# Patient Record
Sex: FEMALE | Race: WHITE | Employment: OTHER | URBAN - METROPOLITAN AREA
[De-identification: names, ages, dates, MRNs, and addresses within clinical notes are randomized per-mention and may not be internally consistent; named-entity substitution may affect disease eponyms.]

---

## 2017-01-01 ENCOUNTER — NURSING HOME VISIT (OUTPATIENT)
Dept: GERIATRICS | Facility: CLINIC | Age: 82
End: 2017-01-01
Payer: COMMERCIAL

## 2017-01-01 ENCOUNTER — TELEPHONE (OUTPATIENT)
Dept: GERIATRICS | Facility: CLINIC | Age: 82
End: 2017-01-01

## 2017-01-01 ENCOUNTER — TRANSFERRED RECORDS (OUTPATIENT)
Dept: HEALTH INFORMATION MANAGEMENT | Facility: CLINIC | Age: 82
End: 2017-01-01

## 2017-01-01 VITALS
WEIGHT: 92.8 LBS | RESPIRATION RATE: 16 BRPM | SYSTOLIC BLOOD PRESSURE: 122 MMHG | BODY MASS INDEX: 16.97 KG/M2 | OXYGEN SATURATION: 96 % | HEART RATE: 72 BPM | TEMPERATURE: 97.8 F | DIASTOLIC BLOOD PRESSURE: 69 MMHG

## 2017-01-01 VITALS
OXYGEN SATURATION: 93 % | DIASTOLIC BLOOD PRESSURE: 60 MMHG | SYSTOLIC BLOOD PRESSURE: 100 MMHG | HEIGHT: 62 IN | TEMPERATURE: 98.6 F | BODY MASS INDEX: 17 KG/M2 | RESPIRATION RATE: 17 BRPM | HEART RATE: 67 BPM | WEIGHT: 92.4 LBS

## 2017-01-01 VITALS
HEART RATE: 72 BPM | HEIGHT: 62 IN | WEIGHT: 92 LBS | DIASTOLIC BLOOD PRESSURE: 65 MMHG | TEMPERATURE: 98 F | OXYGEN SATURATION: 96 % | RESPIRATION RATE: 16 BRPM | BODY MASS INDEX: 16.93 KG/M2 | SYSTOLIC BLOOD PRESSURE: 97 MMHG

## 2017-01-01 VITALS
HEIGHT: 62 IN | SYSTOLIC BLOOD PRESSURE: 133 MMHG | WEIGHT: 91 LBS | DIASTOLIC BLOOD PRESSURE: 72 MMHG | OXYGEN SATURATION: 95 % | HEART RATE: 79 BPM | BODY MASS INDEX: 16.75 KG/M2 | RESPIRATION RATE: 15 BRPM | TEMPERATURE: 97.5 F

## 2017-01-01 DIAGNOSIS — F02.80 LATE ONSET ALZHEIMER'S DISEASE WITHOUT BEHAVIORAL DISTURBANCE (H): ICD-10-CM

## 2017-01-01 DIAGNOSIS — I35.0 NONRHEUMATIC AORTIC VALVE STENOSIS: ICD-10-CM

## 2017-01-01 DIAGNOSIS — M54.6 ACUTE LEFT-SIDED THORACIC BACK PAIN: Primary | ICD-10-CM

## 2017-01-01 DIAGNOSIS — I48.20 CHRONIC ATRIAL FIBRILLATION (H): ICD-10-CM
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
## 2017-01-01 DIAGNOSIS — M54.50 ACUTE LEFT-SIDED LOW BACK PAIN WITHOUT SCIATICA: ICD-10-CM

## 2017-01-01 DIAGNOSIS — G30.1 LATE ONSET ALZHEIMER'S DISEASE WITHOUT BEHAVIORAL DISTURBANCE (H): ICD-10-CM

## 2017-01-01 DIAGNOSIS — M81.0 AGE-RELATED OSTEOPOROSIS WITHOUT CURRENT PATHOLOGICAL FRACTURE: ICD-10-CM

## 2017-01-01 DIAGNOSIS — R53.83 LETHARGY: ICD-10-CM

## 2017-01-01 DIAGNOSIS — I50.32 CHRONIC DIASTOLIC CONGESTIVE HEART FAILURE (H): ICD-10-CM

## 2017-01-01 LAB
ANION GAP SERPL CALCULATED.3IONS-SCNC: 7 MMOL/L (ref 5–18)
BUN SERPL-MCNC: 25 MG/DL (ref 8–28)
CALCIUM SERPL-MCNC: 9.5 MG/DL (ref 8.5–10.5)
CHLORIDE SERPLBLD-SCNC: 109 MMOL/L (ref 98–107)
CO2 SERPL-SCNC: 27 MMOL/L (ref 22–31)
CREAT SERPL-MCNC: 0.69 MG/DL (ref 0.6–1.1)
ERYTHROCYTE [DISTWIDTH] IN BLOOD BY AUTOMATED COUNT: 15.7 % (ref 11–14.5)
GFR SERPL CREATININE-BSD FRML MDRD: >60 ML/MIN/1.73M2
GLUCOSE SERPL-MCNC: 105 MG/DL (ref 70–125)
HCT VFR BLD AUTO: 33.2 % (ref 35–47)
HEMOGLOBIN: 10.8 G/DL (ref 12–16)
MCH RBC QN AUTO: 30.4 PG (ref 27–34)
MCHC RBC AUTO-ENTMCNC: 32.5 G/DL (ref 32–36)
MCV RBC AUTO: 94 FL (ref 80–100)
PLATELET # BLD AUTO: 160 THOU/UL (ref 140–440)
POTASSIUM SERPL-SCNC: 3.3 MMOL/L (ref 3.5–5)
POTASSIUM SERPL-SCNC: 3.9 MMOL/L (ref 3.5–5)
RBC # BLD AUTO: 3.55 MILL/UL (ref 3.8–5.4)
SODIUM SERPL-SCNC: 143 MMOL/L (ref 136–145)
WBC # BLD AUTO: 8.4 THOU/UL (ref 4–11)

## 2017-01-01 PROCEDURE — 99309 SBSQ NF CARE MODERATE MDM 30: CPT | Performed by: INTERNAL MEDICINE

## 2017-01-01 PROCEDURE — 99308 SBSQ NF CARE LOW MDM 20: CPT | Performed by: NURSE PRACTITIONER

## 2017-01-01 PROCEDURE — 99309 SBSQ NF CARE MODERATE MDM 30: CPT | Performed by: NURSE PRACTITIONER

## 2017-01-01 RX ORDER — POTASSIUM CHLORIDE 1.5 G/1.58G
20 POWDER, FOR SOLUTION ORAL DAILY
COMMUNITY
Start: 2017-01-01 | End: 2017-01-01

## 2017-01-01 RX ORDER — ALBUTEROL SULFATE 0.83 MG/ML
1 SOLUTION RESPIRATORY (INHALATION) 3 TIMES DAILY
COMMUNITY
Start: 2017-01-01 | End: 2017-01-01

## 2017-01-24 ENCOUNTER — NURSING HOME VISIT (OUTPATIENT)
Dept: GERIATRICS | Facility: CLINIC | Age: 82
End: 2017-01-24
Payer: COMMERCIAL

## 2017-01-24 VITALS
SYSTOLIC BLOOD PRESSURE: 130 MMHG | TEMPERATURE: 98.4 F | HEIGHT: 62 IN | WEIGHT: 100.8 LBS | HEART RATE: 79 BPM | OXYGEN SATURATION: 95 % | RESPIRATION RATE: 18 BRPM | DIASTOLIC BLOOD PRESSURE: 85 MMHG | BODY MASS INDEX: 18.55 KG/M2

## 2017-01-24 DIAGNOSIS — M81.0 OSTEOPOROSIS: Primary | ICD-10-CM

## 2017-01-24 DIAGNOSIS — M25.531 RIGHT WRIST PAIN: ICD-10-CM

## 2017-01-24 PROCEDURE — 99207 ZZC CDG-CORRECTLY CODED, REVIEWED AND AGREE: CPT | Performed by: NURSE PRACTITIONER

## 2017-01-24 PROCEDURE — 99308 SBSQ NF CARE LOW MDM 20: CPT | Performed by: NURSE PRACTITIONER

## 2017-01-24 NOTE — PROGRESS NOTES
"[facility]    Chief Complaint   Patient presents with     Nursing Home Acute       HPI:    Marce Segura is a 89 year old  (3/12/1923), who is being seen today for an episodic care visit at [facility]. Today's concern is:    Osteoporosis  Right wrist pain  Seen today after pharmacist review of medications.  Suggest to d'c scheduled dilaudid.  Currently with 0.5mg QD and QD prn.  Medication started after patient sustained fall and right wrist fracture summer 2016.  Pain thought to be more r/t advanced osteoporosis.  Have attempted to not have scheduled dilaudid in the past and patient responded poorly, continually crying out in pain.  Pain most significant in the morning when scheduled dilaudid given.  Has been effective in controlling pain.  No adverse effects currently noted, no recent falls reported.  Have discussed with patient healthcare agent, Rae Ndiaye in the past.  Goal of care is comfort, and she was agreeable wanting to keep medication for patient's comfort.  Currently on small dose which has been effective     Last 3 BPs: 143/91, 143/79, 132/77.  Current Weight: 100.8lbs    REVIEW OF SYSTEMS:  10 point ROS of systems including Constitutional, Eyes, Respiratory, Cardiovascular, Gastroenterology, Genitourinary, Integumentary, Muscularskeletal, Psychiatric were all negative except for pertinent positives noted in my HPI.    /85 mmHg  Pulse 79  Temp(Src) 98.4  F (36.9  C)  Resp 18  Ht 5' 2\" (1.575 m)  Wt 100 lb 12.8 oz (45.723 kg)  BMI 18.43 kg/m2  SpO2 95%  GENERAL APPEARANCE:  Alert, in no distress  Respiratory: non-labored, on room air, no cough  Musculoskeletal: ambulates with 4ww, gait steady, no erythema/edema of joints  Neuro: no facial asymmetry, speech clear, no tremor     ASSESSMENT/PLAN:  (M81.0) Osteoporosis  (primary encounter diagnosis)  Comment: Chronic  Plan: Continue calcium with D supplementation  -Continue scheduled dilaudid--low " dose, has been effective in controlling pain, no adverse effects currently noted, no recent falls, improves patient's QOL--family agreeable to continue     (M25.531) Right wrist pain  Comment: Chronic--well managed with current POC  Plan: As above           JH Hogan CNP

## 2017-01-27 ENCOUNTER — TRANSFERRED RECORDS (OUTPATIENT)
Dept: HEALTH INFORMATION MANAGEMENT | Facility: CLINIC | Age: 82
End: 2017-01-27

## 2017-01-27 ENCOUNTER — TELEPHONE (OUTPATIENT)
Dept: GERIATRICS | Facility: CLINIC | Age: 82
End: 2017-01-27

## 2017-01-28 ENCOUNTER — TELEPHONE (OUTPATIENT)
Dept: GERIATRICS | Facility: CLINIC | Age: 82
End: 2017-01-28

## 2017-01-28 NOTE — TELEPHONE ENCOUNTER
UA positive, patient symptomatic with weakness and confusion. CrCl 39    PLAN  Cipro 250 mg PO BID x 3 days. DC atbx if UC negative or update provider with sensitivities.     Electronically signed by JH Rizzo GNP     At 1645 notified that UC now available and no growth  DC above order.  Monitor.    Electronically signed by JH Rizzo GNP

## 2017-02-02 ENCOUNTER — NURSING HOME VISIT (OUTPATIENT)
Dept: GERIATRICS | Facility: CLINIC | Age: 82
End: 2017-02-02
Payer: COMMERCIAL

## 2017-02-02 VITALS
HEART RATE: 81 BPM | WEIGHT: 98.1 LBS | OXYGEN SATURATION: 95 % | SYSTOLIC BLOOD PRESSURE: 127 MMHG | TEMPERATURE: 98 F | BODY MASS INDEX: 18.05 KG/M2 | HEIGHT: 62 IN | DIASTOLIC BLOOD PRESSURE: 78 MMHG | RESPIRATION RATE: 18 BRPM

## 2017-02-02 DIAGNOSIS — M81.0 OSTEOPOROSIS: ICD-10-CM

## 2017-02-02 DIAGNOSIS — I48.20 CHRONIC ATRIAL FIBRILLATION (H): ICD-10-CM

## 2017-02-02 DIAGNOSIS — F41.9 ANXIETY: ICD-10-CM

## 2017-02-02 DIAGNOSIS — M21.612 BUNION, LEFT: ICD-10-CM

## 2017-02-02 DIAGNOSIS — E03.9 HYPOTHYROIDISM, UNSPECIFIED TYPE: ICD-10-CM

## 2017-02-02 DIAGNOSIS — F02.80 LATE ONSET ALZHEIMER'S DISEASE WITHOUT BEHAVIORAL DISTURBANCE (H): Primary | ICD-10-CM

## 2017-02-02 DIAGNOSIS — I35.0 AORTIC VALVE STENOSIS, UNSPECIFIED ETIOLOGY: ICD-10-CM

## 2017-02-02 DIAGNOSIS — G30.1 LATE ONSET ALZHEIMER'S DISEASE WITHOUT BEHAVIORAL DISTURBANCE (H): Primary | ICD-10-CM

## 2017-02-02 PROCEDURE — 99310 SBSQ NF CARE HIGH MDM 45: CPT | Performed by: NURSE PRACTITIONER

## 2017-02-02 PROCEDURE — 99207 ZZC CDG-CORRECTLY CODED, REVIEWED AND AGREE: CPT | Performed by: NURSE PRACTITIONER

## 2017-02-02 NOTE — PROGRESS NOTES
[place] [facility]    Chief Complaint   Patient presents with     prison Regulatory       HPI:    Marce Segura is a 89 year old  (3/12/1923), who is being seen today for a federally mandated E/M visit at [facility]. Today's concerns are:    Chronic atrial fibrillation (H)  Aortic valve stenosis, unspecified etiology  Developed a-fib during hospitalization in August 2015. She was started on low dose amiodarone.  Cardiologist Dr. Holloway, recommendeds to continue ammiodarone given patient's underlying aortic stenosis, as she may become very symptomatic.  Last seen by cardiology in December 2015.  Due to age and goals of care patient is anticoagulated with baby aspirin. Her rate has been controlled HR 70's-80's, she denies shortness of breath, reports occasional palpitations--quickly resolve, chest pain, dizziness or lightheadedness.  Last 3 BPs: 132/69, 138/79, 129/76.  Labs closely monitored while on amiodarone     Late onset Alzheimer's disease without behavioral disturbance  Poor short-term memory, but no behaviors noted by staff.  Increased confusion and weakness reported end of last week, UA/UC ordered--negative.  Today appears at baseline.  Needs 24 hour monitoring for safety.  Enjoying participating in activities on site per nursing, eating and sleeping well.  Weight stable    Wt Readings from Last 3 Encounters:   02/02/17 98 lb 1.6 oz (44.498 kg)   01/24/17 100 lb 12.8 oz (45.723 kg)   12/28/16 98 lb 8 oz (44.679 kg)       Anxiety  Had increasing anxiety, disruptive to patient, family was agreeable to start Zoloft, began December 2015.  Staff and family feel has been helpful for patient, no adverse effects currently noted.  Goal of care comfort.  Today patient denies anxiety, sleeping well    Hypothyroidism, unspecified type  On synthroid, TSH     2.21   11/3/2016, currently at 100mcg daily.  Patient is on amiodarone, for history of a-fib with aortic  stenosis, likely cause of hypothyroidism.  Patient denies/no reports of constipation, changes in hair or nails, temperature intolerance.    Osteoporosis  No recent falls, ambulates with walker, denies fear of falling.  On calcium with vitamin D supplementation.  Had a fall in June, fractured right wrist, routine healing, was followed by RADHA Powers, orthopedic PA.  Continues to report pain to wrist since break.  Family agreeable to dilaudid Q morning for comfort.  Has worked well in managing pain in addition to her scheduled tylenol, patient today reports has been helpful.  Denies/no reports of injury or falls.  Continues to report pain to her right wrist.  Per previous conversation with ortho, significant osteoporosis to wrist and most likely cause of pain.  Denies changes in sensation or movement.      Bunion, left  Reporting pain to her left foot today.  Patient with bunion that rubs on her shoe--currently with dressing in place to protect area--still with erythema.  Resting in bed with her shoes on.        Current Weight: 98.1lbs    ALLERGIES: No known allergies  PAST MEDICAL HISTORY:  has a past medical history of Back pain; Lumbar compression fracture (H); Atrial fibrillation (H); Congestive heart failure (H); Hyperkalemia; Hypernatremia; Subarachnoid hemorrhage (H) (8/10/15); Dementia without behavioral disturbance; Osteoporosis; and Aortic stenosis.  PAST SURGICAL HISTORY:  has past surgical history that includes neck injury (years ago); Sigmoidoscopy flexible (12/19/2011); and lower back surgery.  FAMILY HISTORY: family history includes CANCER in her sister and sister; DIABETES in her father and mother; Hypertension in her brother.  SOCIAL HISTORY:  reports that she has never smoked. She has never used smokeless tobacco. She reports that she does not drink alcohol or use illicit drugs.    MEDICATIONS:  Current Outpatient Prescriptions   Medication Sig Dispense Refill     HYDROmorphone (DILAUDID) 2 MG tablet Take  0.05 mg by mouth daily       HYDROmorphone (DILAUDID) 2 MG tablet Take 0.5 mg by mouth daily as needed for moderate to severe pain       diclofenac (VOLTAREN) 1 % GEL Place 2 g onto the skin 4 times daily        diclofenac (VOLTAREN) 1 % GEL Place 2 g onto the skin 2 times daily as needed for moderate pain       levothyroxine (SYNTHROID,LEVOTHROID) 100 MCG tablet Take 100 mcg by mouth daily       conjugated estrogens (PREMARIN) vaginal cream Place 0.5 g vaginally twice a week On Monday and Thursday       witch hazel-glycerin (TUCKS) pad Apply 1 each topically as needed for hemorrhoids       acetaminophen (TYLENOL) 500 MG tablet Take 1,000 mg by mouth as needed for mild pain       acetaminophen (TYLENOL) 500 MG tablet Take 1,000 mg by mouth 3 times daily       pramox-pe-glycerin-petrolatum (PREPARATION H) 1-0.25-14.4-15 % CREA Place rectally 4 times daily as needed for hemorrhoids       sertraline (ZOLOFT) 25 MG tablet Take 25 mg by mouth daily       amiodarone (PACERONE/CODARONE) 200 MG tablet Take 0.5 tablets (100 mg) by mouth daily 60 tablet 4     aspirin 81 MG chewable tablet Take 81 mg by mouth daily       ipratropium - albuterol 0.5 mg/2.5 mg/3 mL (DUONEB) 0.5-2.5 (3) MG/3ML nebulization Take 1 vial by nebulization every 6 hours as needed for shortness of breath / dyspnea or wheezing AND give TID until 8/1/2016 then go back to q 6 hrs PRN       senna-docusate (SENOKOT-S;PERICOLACE) 8.6-50 MG per tablet Take 1 tablet by mouth 2 times daily        bisacodyl (DULCOLAX) 10 MG suppository Place 10 mg rectally daily as needed for constipation       Calcium Carbonate-Vitamin D (CALCIUM + D) 600-200 MG-UNIT per tablet Take 1 tablet by mouth 2 times daily.       Multiple Vitamin (MULTIVITAMIN) per tablet Take 1 tablet by mouth daily.       Medications reviewed:  Medications reconciled to facility chart and changes were made to reflect current medications as identified as above med list. Below are the changes that were  "made:   Medications stopped since last EPIC medication reconciliation:   There are no discontinued medications.    Medications started since last [facility] medication reconciliation:  No orders of the defined types were placed in this encounter.         Case Management:  I have reviewed the care plan and MDS and do agree with the plan. Patient's desire to return to the community is not present.  Information reviewed:  Medications, vital signs, orders, and nursing notes.    ROS:  10 point ROS of systems including Constitutional, Eyes, Respiratory, Cardiovascular, Gastroenterology, Genitourinary, Integumentary, Muscularskeletal, Psychiatric were all negative except for pertinent positives noted in my HPI.    Exam:  /78 mmHg  Pulse 81  Temp(Src) 98  F (36.7  C)  Resp 18  Ht 5' 2\" (1.575 m)  Wt 98 lb 1.6 oz (44.498 kg)  BMI 17.94 kg/m2  SpO2 95%  GENERAL APPEARANCE:  Alert, in no distress  EYES:  EOM, conjunctivae, lids, pupils and irises normal  NECK:  No adenopathy,masses or thyromegaly  RESP:  respiratory effort and palpation of chest normal, lungs clear to auscultation , no respiratory distress  CV:  Palpation and auscultation of heart done , no edema, +2 pedal pulses, rate-normal, grade 2/6 murmur  ABDOMEN:  normal bowel sounds, soft, nontender, no hepatosplenomegaly or other masses  M/S:   Gait and station abnormal uses walker for ambulation, kyphosis, CMS intact, right wrist ROM intact, bilateral hand strength +3/5   Digits and nails normal  SKIN:  left foot with non-blanchable erythema to bunion, no open areas or rashes noted   NEURO:   Cranial nerves 2-12 are normal tested and grossly at patient's baseline, speech clear, no facial asymmetry   PSYCH:  oriented to person, place, insight and judgement impaired, memory impaired , affect and mood normal, no anxiety observed during encounter, pleasant        Lab/Diagnostic data:    Last Basic Metabolic Panel:  Lab Test 11/03/16   NA  139   POTASSIUM  3.8 "   CHLORIDE  105   MARIANO  8.6   CO2   --    BUN  18   CR  0.64   GLC  82       Liver Function Studies -   Lab Test 11/03/16   PROTTOTAL  6.2   ALBUMIN  2.8*   BILITOTAL  0.2   ALKPHOS  53   AST  14   ALT  8     TSH   Date Value Ref Range Status   11/03/2016 2.21 mcU/mL Final     ASSESSMENT/PLAN  (G30.1,  F02.80) Late onset Alzheimer's disease without behavioral disturbance  (primary encounter diagnosis)  Comment: Chronic   Plan: appropriate for LTC    (I48.2) Chronic atrial fibrillation (H)  Comment: Chronic, rate controlled  Plan: Continue amiodarone as long as patient tolerates  -LFT's, TSH, BMP, Mg Q3 months for monitoring  -baby asa anticoagulation given age and goals of care     (F41.9) Anxiety  Comment: Chronic, significantly improved with Zoloft  Plan: Continue Zoloft, no adverse effects noted, goal of care comfort, has improved patient's QOL     (E03.9) Hypothyroidism, unspecified type  Comment: Chronic, stable  Plan: Continue synthroid  -Monitor TSH    (I35.0) Aortic valve stenosis, unspecified etiology  Comment: Chronic   Plan: As per POC under a-fib    (M81.0) Osteoporosis  Comment: Chronic, contributes to pain   Plan: Continue calcium with vitamin D  -Continue APAP and dilaudid for pain--family agreeable, has helped with controlling pain, goal of care comfort    (M21.612) Bunion, left  Comment: Chronic, rubbing on shoes  Plan: shoes to be off when in bed  -See podiatry, may need to be fit for new shoes  -Continue dressing to site for added protection--lambs wool   -Staff to monitor for skin breakdown         Total time spent with patient visit was 35 min including patient visit and review of past records.Greater than 50% of total time spent with counseling and coordinating care.    Electronically signed by:  JH Hogan CNP

## 2017-04-17 ENCOUNTER — NURSING HOME VISIT (OUTPATIENT)
Dept: GERIATRICS | Facility: CLINIC | Age: 82
End: 2017-04-17
Payer: COMMERCIAL

## 2017-04-17 DIAGNOSIS — G30.1 LATE ONSET ALZHEIMER'S DISEASE WITHOUT BEHAVIORAL DISTURBANCE (H): ICD-10-CM

## 2017-04-17 DIAGNOSIS — E46 PROTEIN-CALORIE MALNUTRITION (H): ICD-10-CM

## 2017-04-17 DIAGNOSIS — F02.80 LATE ONSET ALZHEIMER'S DISEASE WITHOUT BEHAVIORAL DISTURBANCE (H): ICD-10-CM

## 2017-04-17 DIAGNOSIS — I35.0 AORTIC VALVE STENOSIS, UNSPECIFIED ETIOLOGY: ICD-10-CM

## 2017-04-17 DIAGNOSIS — Z71.89 ADVANCED DIRECTIVES, COUNSELING/DISCUSSION: ICD-10-CM

## 2017-04-17 DIAGNOSIS — I50.32 CHRONIC DIASTOLIC CONGESTIVE HEART FAILURE (H): Primary | ICD-10-CM

## 2017-04-17 DIAGNOSIS — I48.0 PAROXYSMAL ATRIAL FIBRILLATION (H): ICD-10-CM

## 2017-04-17 PROCEDURE — 99207 ZZC CDG-CORRECTLY CODED, REVIEWED AND AGREE: CPT | Performed by: FAMILY MEDICINE

## 2017-04-17 PROCEDURE — 99310 SBSQ NF CARE HIGH MDM 45: CPT | Performed by: FAMILY MEDICINE

## 2017-04-17 NOTE — PROGRESS NOTES
"  [facility]    Chief Complaint   Patient presents with     [employer] Regulatory       HPI:    Marce Segura is a 89 year old  (3/12/1923), who is being seen today for a federally mandated E/M visit at [facility]. Today's concerns are:  1. Chronic diastolic congestive heart failure (H) - patient SOB at rest, discussed with nurse, new change, not noted earlier. Lungs clear, does have AS,    2. Aortic valve stenosis, unspecified etiology -    3. Paroxysmal atrial fibrillation (H) reg rate but elevated today, on amiodarone   4. Late onset Alzheimer's disease without behavioral disturbance - unable to give much hx. Would like \"help\" breathing   5. Protein-calorie malnutrition (H) - 8 lb wt loss over last 6 months. BMI now 17. Due to dementia, dysphagia   6. Advance Care Planning - confirmed DNR, comfort care       ALLERGIES: No known allergies  PAST MEDICAL HISTORY:  has a past medical history of Aortic stenosis; Atrial fibrillation (H); Back pain; Congestive heart failure (H); Dementia without behavioral disturbance; Hyperkalemia; Hypernatremia; Lumbar compression fracture (H); Osteoporosis; and Subarachnoid hemorrhage (H) (8/10/15).  PAST SURGICAL HISTORY:  has a past surgical history that includes neck injury (years ago); Sigmoidoscopy flexible (12/19/2011); and lower back surgery.  FAMILY HISTORY: family history includes CANCER in her sister and sister; DIABETES in her father and mother; Hypertension in her brother.  SOCIAL HISTORY:  reports that she has never smoked. She has never used smokeless tobacco. She reports that she does not drink alcohol or use illicit drugs.    MEDICATIONS:  Current Outpatient Prescriptions   Medication Sig Dispense Refill     magnesium hydroxide (MILK OF MAGNESIA) 400 MG/5ML suspension Take 30 mLs by mouth daily as needed for constipation or heartburn       HYDROmorphone (DILAUDID) 2 MG tablet Take 0.05 mg by mouth daily   "     HYDROmorphone (DILAUDID) 2 MG tablet Take 0.5 mg by mouth daily as needed for moderate to severe pain       diclofenac (VOLTAREN) 1 % GEL Place 2 g onto the skin 4 times daily        diclofenac (VOLTAREN) 1 % GEL Place 2 g onto the skin 2 times daily as needed for moderate pain       levothyroxine (SYNTHROID,LEVOTHROID) 100 MCG tablet Take 100 mcg by mouth daily       conjugated estrogens (PREMARIN) vaginal cream Place 0.5 g vaginally twice a week On Monday and Thursday       witch hazel-glycerin (TUCKS) pad Apply 1 each topically as needed for hemorrhoids       acetaminophen (TYLENOL) 500 MG tablet Take 1,000 mg by mouth as needed for mild pain       acetaminophen (TYLENOL) 500 MG tablet Take 1,000 mg by mouth 3 times daily       pramox-pe-glycerin-petrolatum (PREPARATION H) 1-0.25-14.4-15 % CREA Place rectally 4 times daily as needed for hemorrhoids       sertraline (ZOLOFT) 25 MG tablet Take 25 mg by mouth daily       amiodarone (PACERONE/CODARONE) 200 MG tablet Take 0.5 tablets (100 mg) by mouth daily 60 tablet 4     aspirin 81 MG chewable tablet Take 81 mg by mouth daily       ipratropium - albuterol 0.5 mg/2.5 mg/3 mL (DUONEB) 0.5-2.5 (3) MG/3ML nebulization Take 1 vial by nebulization every 6 hours as needed for shortness of breath / dyspnea or wheezing AND give TID until 8/1/2016 then go back to q 6 hrs PRN       senna-docusate (SENOKOT-S;PERICOLACE) 8.6-50 MG per tablet Take 1 tablet by mouth 2 times daily        bisacodyl (DULCOLAX) 10 MG suppository Place 10 mg rectally daily as needed for constipation       Calcium Carbonate-Vitamin D (CALCIUM + D) 600-200 MG-UNIT per tablet Take 1 tablet by mouth 2 times daily.       Multiple Vitamin (MULTIVITAMIN) per tablet Take 1 tablet by mouth daily.       Medications reviewed:  Medications reconciled to facility chart and changes were made to reflect current medications as identified as above med list. Below are the changes that were made:   Medications  "stopped since last EPIC medication reconciliation:   There are no discontinued medications.    Medications started since last [facility] medication reconciliation:  Orders Placed This Encounter   Medications     magnesium hydroxide (MILK OF MAGNESIA) 400 MG/5ML suspension     Sig: Take 30 mLs by mouth daily as needed for constipation or heartburn         Case Management:  I have reviewed the care plan and MDS and do agree with the plan. Patient's desire to return to the community is not present.  Information reviewed:  Medications, vital signs, orders, and nursing notes.    ROS:  Unobtainable secondary to cognitive impairment or aphasia, but today pt reports breathing heavy    Exam:  /72  Pulse 74  Temp 98.4  F (36.9  C)  Resp 18  Ht 5' 2\" (1.575 m)  Wt 97 lb 12.8 oz (44.4 kg)  SpO2 95%  BMI 17.89 kg/m2  GENERAL APPEARANCE:  Alert, in mild distress due to SOB  RESP:  lungs clear to auscultation , diminished breath sounds bilat bases post, dyspneic, SOB RR 22  CV:  Palpation and auscultation of heart done , no edema, tachycardic rate 94  M/S:   Gait and station abnormal bed bound today  PSYCH:  memory impaired , anxious    Lab/Diagnostic data:      No recent labs on file.    ASSESSMENT/PLAN    1. Chronic diastolic congestive heart failure (H) - patient SOB at rest, discussed with nurse, new change, not noted earlier. Lungs clear, does have AS, Plan- add O2 2liter to see if helps breathing., monitor for signs of pul edema and add lasix if needed, If worsens will contact family to recommend hospice   2. Aortic valve stenosis, unspecified etiology - stable   3. Paroxysmal atrial fibrillation (H) reg rate but elevated today, on amiodarone   4. Late onset Alzheimer's disease without behavioral disturbance - unable to give much hx. Would like \"help\" breathing- cont 24/7 NH care   5. Protein-calorie malnutrition (H) - 8 lb wt loss over last 6 months. BMI now 17. Due to dementia, dysphagia   6. Advance Care Planning - " confirmed DNR, comfort care, cont tx plan with added O2           Electronically signed by:  Russ Martinez MD

## 2017-04-20 VITALS
HEART RATE: 94 BPM | RESPIRATION RATE: 22 BRPM | TEMPERATURE: 98.4 F | BODY MASS INDEX: 18 KG/M2 | WEIGHT: 97.8 LBS | DIASTOLIC BLOOD PRESSURE: 72 MMHG | OXYGEN SATURATION: 92 % | SYSTOLIC BLOOD PRESSURE: 145 MMHG | HEIGHT: 62 IN

## 2017-04-20 PROBLEM — I50.32 CHRONIC DIASTOLIC CONGESTIVE HEART FAILURE (H): Status: ACTIVE | Noted: 2017-04-20

## 2017-04-20 PROBLEM — E46 PROTEIN-CALORIE MALNUTRITION (H): Status: ACTIVE | Noted: 2017-04-20

## 2017-04-20 PROBLEM — I35.0 AORTIC VALVE STENOSIS, UNSPECIFIED ETIOLOGY: Status: ACTIVE | Noted: 2017-04-20

## 2017-04-21 ENCOUNTER — NURSING HOME VISIT (OUTPATIENT)
Dept: GERIATRICS | Facility: CLINIC | Age: 82
End: 2017-04-21
Payer: COMMERCIAL

## 2017-04-21 VITALS
HEART RATE: 96 BPM | OXYGEN SATURATION: 91 % | RESPIRATION RATE: 20 BRPM | BODY MASS INDEX: 18.26 KG/M2 | SYSTOLIC BLOOD PRESSURE: 133 MMHG | HEIGHT: 62 IN | WEIGHT: 99.2 LBS | DIASTOLIC BLOOD PRESSURE: 89 MMHG | TEMPERATURE: 97.2 F

## 2017-04-21 DIAGNOSIS — R30.0 DYSURIA: ICD-10-CM

## 2017-04-21 DIAGNOSIS — I50.32 CHRONIC DIASTOLIC CONGESTIVE HEART FAILURE (H): Primary | ICD-10-CM

## 2017-04-21 DIAGNOSIS — F41.9 ANXIETY: ICD-10-CM

## 2017-04-21 PROCEDURE — 99308 SBSQ NF CARE LOW MDM 20: CPT | Performed by: NURSE PRACTITIONER

## 2017-04-21 NOTE — PROGRESS NOTES
[place] [facility]    Chief Complaint   Patient presents with     Heart Failure     Dysuria       HPI:    Marce Segura is a 89 year old  (3/12/1923), who is being seen today for an episodic care visit at [facility]. Today's concern is: CHF, anxiety, dysuria.  Seen 4/17/17 noted to have some sob with conversation. O2 ordered for comfort. dtr called and stated she did not want O2 for resident as her baseline O2 sats are > 90. Feels O2 tubing would be tripping hazard.  Had staff track O2 past 2 days off O2. sats 91-93% RA.  Has occ anxiety which can result in sob. Prn dilaudid started for resp. Distress. Today staff reports some increased confusion. Resident reports dysuria, increased frequency.      ALLERGIES: No known allergies  Past Medical, Surgical, Family and Social History reviewed and updated in [facility].    Current Outpatient Prescriptions   Medication Sig Dispense Refill     magnesium hydroxide (MILK OF MAGNESIA) 400 MG/5ML suspension Take 30 mLs by mouth daily as needed for constipation or heartburn       HYDROmorphone (DILAUDID) 2 MG tablet Take 0.05 mg by mouth daily       HYDROmorphone (DILAUDID) 2 MG tablet Take 0.5 mg by mouth daily as needed for moderate to severe pain       diclofenac (VOLTAREN) 1 % GEL Place 2 g onto the skin 4 times daily        diclofenac (VOLTAREN) 1 % GEL Place 2 g onto the skin 2 times daily as needed for moderate pain       levothyroxine (SYNTHROID,LEVOTHROID) 100 MCG tablet Take 100 mcg by mouth daily       conjugated estrogens (PREMARIN) vaginal cream Place 0.5 g vaginally twice a week On Monday and Thursday       witch hazel-glycerin (TUCKS) pad Apply 1 each topically as needed for hemorrhoids       acetaminophen (TYLENOL) 500 MG tablet Take 1,000 mg by mouth as needed for mild pain       acetaminophen (TYLENOL) 500 MG tablet Take 1,000 mg by mouth 3 times daily       pramox-pe-glycerin-petrolatum (PREPARATION H) 1-0.25-14.4-15 % CREA Place rectally  "4 times daily as needed for hemorrhoids       sertraline (ZOLOFT) 25 MG tablet Take 25 mg by mouth daily       amiodarone (PACERONE/CODARONE) 200 MG tablet Take 0.5 tablets (100 mg) by mouth daily 60 tablet 4     aspirin 81 MG chewable tablet Take 81 mg by mouth daily       ipratropium - albuterol 0.5 mg/2.5 mg/3 mL (DUONEB) 0.5-2.5 (3) MG/3ML nebulization Take 1 vial by nebulization every 6 hours as needed for shortness of breath / dyspnea or wheezing AND give TID until 8/1/2016 then go back to q 6 hrs PRN       senna-docusate (SENOKOT-S;PERICOLACE) 8.6-50 MG per tablet Take 1 tablet by mouth 2 times daily        bisacodyl (DULCOLAX) 10 MG suppository Place 10 mg rectally daily as needed for constipation       Calcium Carbonate-Vitamin D (CALCIUM + D) 600-200 MG-UNIT per tablet Take 1 tablet by mouth 2 times daily.       Multiple Vitamin (MULTIVITAMIN) per tablet Take 1 tablet by mouth daily.       Medications reviewed:  Medications reconciled to facility chart and changes were made to reflect current medications as identified as above med list. Below are the changes that were made:   Medications stopped since last EPIC medication reconciliation:   There are no discontinued medications.    Medications started since last [facility] medication reconciliation:  No orders of the defined types were placed in this encounter.        REVIEW OF SYSTEMS:  CONSTITUTIONAL:  forgetfulness, EYES:  glasses or contacts, ENT:  neg, CV:  neg, RESPIRATORY: neg, :  dysuria, GI:  neg, NEURO:  neg, PSYCH: neg and MUSCULOSKELETAL: neg. ROS limited due to STML    Physical Exam:  /89  Pulse 96  Temp 97.2  F (36.2  C)  Resp 20  Ht 5' 2\" (1.575 m)  Wt 99 lb 3.2 oz (45 kg)  SpO2 91%  BMI 18.14 kg/m2  GENERAL APPEARANCE:  Alert, in no distress, cooperative  ENT:  Mouth and posterior oropharynx normal, moist mucous membranes, [place]  EYES:  EOM, conjunctivae, lids, pupils and irises normal, PERRL  NECK:  No adenopathy,masses or " thyromegaly  RESP:  respiratory effort and palpation of chest normal, lungs clear to auscultation , no respiratory distress, diminished breath sounds bibasilar.  CV:  Palpation and auscultation of heart done , regular rate and rhythm, no murmur, rub, or gallop, no edema  ABDOMEN:  normal bowel sounds, soft, nontender, no hepatosplenomegaly or other masses, no guarding or rebound  LYMPHATICS:  No adenopathy in neck   M/S:   muscle strength 5/5 all 4ext., normal tone  NEURO:   Cranial nerves 2-12 are normal tested and grossly at patient's baseline, speech clear, no tremor  PSYCH:  insight and judgement impaired, memory impaired , affect and mood normal    Recent Labs:      No recent labs on file.    Assessment/Plan:  Chronic diastolic congestive heart failure (H)  Gen.controlled. occ sob. A-fib,aortic stenosis   1. Cont. Prn duonebs  2. For sob,may sched. Neb txs  3. Cot. Amiodarone  4. Monitor for edema    Anxiety  Occ, recurrent  1. Cont. Dilaudid-sched. And prn  2. For more freq. Anxiety due to resp. Distress may increase sched. dilaudid    Dysuria  Onset today with increased frequency  1. UA/UC  2. Monitor for fever  3. Encourage fluids          Electronically signed by  JH Tyler CNP

## 2017-04-24 ENCOUNTER — NURSING HOME VISIT (OUTPATIENT)
Dept: GERIATRICS | Facility: CLINIC | Age: 82
End: 2017-04-24
Payer: COMMERCIAL

## 2017-04-24 VITALS
BODY MASS INDEX: 18.33 KG/M2 | DIASTOLIC BLOOD PRESSURE: 80 MMHG | HEIGHT: 62 IN | SYSTOLIC BLOOD PRESSURE: 116 MMHG | TEMPERATURE: 99.1 F | HEART RATE: 71 BPM | RESPIRATION RATE: 20 BRPM | OXYGEN SATURATION: 91 % | WEIGHT: 99.6 LBS

## 2017-04-24 DIAGNOSIS — M54.42 CHRONIC MIDLINE LOW BACK PAIN WITH LEFT-SIDED SCIATICA: ICD-10-CM

## 2017-04-24 DIAGNOSIS — F02.80 LATE ONSET ALZHEIMER'S DISEASE WITHOUT BEHAVIORAL DISTURBANCE (H): ICD-10-CM

## 2017-04-24 DIAGNOSIS — G89.29 CHRONIC MIDLINE LOW BACK PAIN WITH LEFT-SIDED SCIATICA: ICD-10-CM

## 2017-04-24 DIAGNOSIS — G30.1 LATE ONSET ALZHEIMER'S DISEASE WITHOUT BEHAVIORAL DISTURBANCE (H): ICD-10-CM

## 2017-04-24 DIAGNOSIS — R50.9 FEVER, UNSPECIFIED: Primary | ICD-10-CM

## 2017-04-24 PROCEDURE — 99308 SBSQ NF CARE LOW MDM 20: CPT | Performed by: NURSE PRACTITIONER

## 2017-04-24 NOTE — PROGRESS NOTES
[facility]    Chief Complaint   Patient presents with     Fever       HPI:    Marce Segura is a 89 year old  (3/12/1923), who is being seen today for an episodic care visit at [facility]. Today's concern is: fever, alzheimer's, back pain. Has had occ. Low grade temp past few days. Had reported urinary freq.  Had some sob at times, but appeared anxiety related. O2 sats stable RA. No reports of cough. UA/UC done which showed no growth.  Has some increased confusion last week which has cleared.  Has memory loss due to alzheimer's. Reports oc. Low back pain, pain of L toes. No redness or wounds/trauma reported to L foot.  Has tylenol, dilaudid for pain.      ALLERGIES: No known allergies  Past Medical, Surgical, Family and Social History reviewed and updated in EPIC.    Current Outpatient Prescriptions   Medication Sig Dispense Refill     magnesium hydroxide (MILK OF MAGNESIA) 400 MG/5ML suspension Take 30 mLs by mouth daily as needed for constipation or heartburn       HYDROmorphone (DILAUDID) 2 MG tablet Take 0.05 mg by mouth daily       HYDROmorphone (DILAUDID) 2 MG tablet Take 0.5 mg by mouth daily as needed for moderate to severe pain       diclofenac (VOLTAREN) 1 % GEL Place 2 g onto the skin 4 times daily        diclofenac (VOLTAREN) 1 % GEL Place 2 g onto the skin 2 times daily as needed for moderate pain       levothyroxine (SYNTHROID,LEVOTHROID) 100 MCG tablet Take 100 mcg by mouth daily       conjugated estrogens (PREMARIN) vaginal cream Place 0.5 g vaginally twice a week On Monday and Thursday       witch hazel-glycerin (TUCKS) pad Apply 1 each topically as needed for hemorrhoids       acetaminophen (TYLENOL) 500 MG tablet Take 1,000 mg by mouth as needed for mild pain       acetaminophen (TYLENOL) 500 MG tablet Take 1,000 mg by mouth 3 times daily       pramox-pe-glycerin-petrolatum (PREPARATION H) 1-0.25-14.4-15 % CREA Place rectally 4 times daily  "as needed for hemorrhoids       sertraline (ZOLOFT) 25 MG tablet Take 25 mg by mouth daily       amiodarone (PACERONE/CODARONE) 200 MG tablet Take 0.5 tablets (100 mg) by mouth daily 60 tablet 4     aspirin 81 MG chewable tablet Take 81 mg by mouth daily       ipratropium - albuterol 0.5 mg/2.5 mg/3 mL (DUONEB) 0.5-2.5 (3) MG/3ML nebulization Take 1 vial by nebulization every 6 hours as needed for shortness of breath / dyspnea or wheezing AND give TID until 8/1/2016 then go back to q 6 hrs PRN       senna-docusate (SENOKOT-S;PERICOLACE) 8.6-50 MG per tablet Take 1 tablet by mouth 2 times daily        bisacodyl (DULCOLAX) 10 MG suppository Place 10 mg rectally daily as needed for constipation       Calcium Carbonate-Vitamin D (CALCIUM + D) 600-200 MG-UNIT per tablet Take 1 tablet by mouth 2 times daily.       Multiple Vitamin (MULTIVITAMIN) per tablet Take 1 tablet by mouth daily.       Medications reviewed:  Medications reconciled to facility chart and changes were made to reflect current medications as identified as above med list. Below are the changes that were made:   Medications stopped since last EPIC medication reconciliation:   There are no discontinued medications.    Medications started since last [facility] medication reconciliation:  No orders of the defined types were placed in this encounter.        REVIEW OF SYSTEMS:  CONSTITUTIONAL:  forgetfulness, EYES:  glasses or contacts, ENT:  [place], CV:  neg, RESPIRATORY: neg, :  neg, GI:  neg, NEURO:  neg, PSYCH: neg and MUSCULOSKELETAL: muscular weakness and occ low back pain, L toe pain. ROS limited due to STML    Physical Exam:  /80  Pulse 71  Temp 99.1  F (37.3  C)  Resp 20  Ht 5' 2\" (1.575 m)  Wt 99 lb 9.6 oz (45.2 kg)  SpO2 91%  BMI 18.22 kg/m2  GENERAL APPEARANCE:  Alert, in no distress, cooperative  ENT:  Mouth and posterior oropharynx normal, moist mucous membranes, [place]  EYES:  EOM, conjunctivae, lids, pupils and irises normal, PERRL  NECK:  No " adenopathy,masses or thyromegaly, no carotid bruit  RESP:  respiratory effort and palpation of chest normal, lungs clear to auscultation , no respiratory distress, diminished breath sounds bibasilar  CV:  Palpation and auscultation of heart done , regular rate and rhythm, no murmur, rub, or gallop, no edema  ABDOMEN:  normal bowel sounds, soft, nontender, no hepatosplenomegaly or other masses, no guarding or rebound  M/S:   muscle strength 5/5 all 4 ext., normal tone. gen. LE weakness  SKIN:  Inspection of skin and subcutaneous tissue baseline, Palpation of skin and subcutaneous tissue baseline  NEURO:   Cranial nerves 2-12 are normal tested and grossly at patient's baseline, speech clear, no tremor  PSYCH:  insight and judgement impaired, memory impaired , affect abnormal -flat    Recent Labs:      Last Basic Metabolic Panel:  Recent Labs   Lab Test 11/03/16 05/03/16   08/20/15   0640  08/19/15   0626   NA  139  143   < >  136  138   POTASSIUM  3.8  4.1   < >  4.3  4.2   CHLORIDE  105  105   < >  105  105   MARIANO  8.6  9.6   < >  7.9*  8.4*   CO2   --    --    --   26  29   BUN  18  22   < >  10  8   CR  0.64  0.66   < >  0.54  0.52   GLC  82  73   < >  95  87    < > = values in this interval not displayed.       Liver Function Studies -   Recent Labs   Lab Test 11/03/16 05/09/16   PROTTOTAL  6.2  6.9   ALBUMIN  2.8*  3.2*   BILITOTAL  0.2  0.3   ALKPHOS  53  60   AST  14  17   ALT  8  12       Assessment/Plan:  Fever, unspecified  Unclear etiology  1. Monitor for further dysuria, increased urinary freq  2. Encourage fluids  3. VS bid x 3 days  4. For cough, decreased O2 sats, check CXR    Late onset Alzheimer's disease without behavioral disturbance  Memory loss. Recent confusion cleared  1. Cont. zoloft  2. Monitor for increased anxiety  3. Monitor for increased confusion    Chronic midline low back pain with left-sided sciatica  Gen. Controlled. occ pain L toes  1. Cont. Tylenol, dilaudid  2. For ongoing toe pain  consider foot cradle for bed        Electronically signed by  JH Tyler CNP

## 2017-04-27 ENCOUNTER — MEDICAL CORRESPONDENCE (OUTPATIENT)
Dept: HEALTH INFORMATION MANAGEMENT | Facility: CLINIC | Age: 82
End: 2017-04-27

## 2017-05-25 ENCOUNTER — TRANSFERRED RECORDS (OUTPATIENT)
Dept: HEALTH INFORMATION MANAGEMENT | Facility: CLINIC | Age: 82
End: 2017-05-25

## 2017-05-25 LAB
ALBUMIN SERPL-MCNC: 3.2 G/DL (ref 3.5–5)
ALP SERPL-CCNC: 49 U/L (ref 45–120)
ALT SERPL-CCNC: 8 U/L (ref 0–45)
ANION GAP SERPL CALCULATED.3IONS-SCNC: 9 MMOL/L (ref 5–18)
AST SERPL-CCNC: 16 U/L (ref 0–40)
BILIRUB SERPL-MCNC: 0.3 MG/DL (ref 0–1)
BILIRUBIN DIRECT: <1 MG/DL
BUN SERPL-MCNC: 20 MG/DL (ref 8–28)
CALCIUM SERPL-MCNC: 9.2 MG/DL (ref 8.5–10.5)
CHLORIDE SERPLBLD-SCNC: 104 MMOL/L (ref 98–107)
CO2 SERPL-SCNC: 29 MMOL/L (ref 22–31)
CREAT SERPL-MCNC: 0.7 MG/DL (ref 0.6–1.1)
GFR SERPL CREATININE-BSD FRML MDRD: >60 ML/MIN/1.73M2
GLUCOSE SERPL-MCNC: 78 MG/DL (ref 70–125)
POTASSIUM SERPL-SCNC: 3.7 MMOL/L (ref 3.5–5)
PROT SERPL-MCNC: 7.1 G/DL (ref 6–8)
SODIUM SERPL-SCNC: 142 MMOL/L (ref 136–145)
TSH SERPL-ACNC: 1.41 UIU/ML (ref 0.3–5)

## 2017-06-05 ENCOUNTER — NURSING HOME VISIT (OUTPATIENT)
Dept: GERIATRICS | Facility: CLINIC | Age: 82
End: 2017-06-05
Payer: COMMERCIAL

## 2017-06-05 VITALS
BODY MASS INDEX: 18.14 KG/M2 | HEART RATE: 81 BPM | OXYGEN SATURATION: 98 % | RESPIRATION RATE: 17 BRPM | WEIGHT: 98.6 LBS | HEIGHT: 62 IN | DIASTOLIC BLOOD PRESSURE: 72 MMHG | TEMPERATURE: 97.8 F | SYSTOLIC BLOOD PRESSURE: 146 MMHG

## 2017-06-05 DIAGNOSIS — E03.2 HYPOTHYROIDISM DUE TO MEDICATION: ICD-10-CM

## 2017-06-05 DIAGNOSIS — I35.0 AORTIC VALVE STENOSIS, UNSPECIFIED ETIOLOGY: ICD-10-CM

## 2017-06-05 DIAGNOSIS — N95.2 ATROPHIC VAGINITIS: ICD-10-CM

## 2017-06-05 DIAGNOSIS — I48.0 PAROXYSMAL ATRIAL FIBRILLATION (H): ICD-10-CM

## 2017-06-05 DIAGNOSIS — M21.612 BUNION, LEFT: ICD-10-CM

## 2017-06-05 DIAGNOSIS — F41.9 ANXIETY: ICD-10-CM

## 2017-06-05 DIAGNOSIS — I50.32 CHRONIC DIASTOLIC CONGESTIVE HEART FAILURE (H): Primary | ICD-10-CM

## 2017-06-05 PROCEDURE — 99207 ZZC CDG-CORRECTLY CODED, REVIEWED AND AGREE: CPT | Performed by: NURSE PRACTITIONER

## 2017-06-05 PROCEDURE — 99310 SBSQ NF CARE HIGH MDM 45: CPT | Performed by: NURSE PRACTITIONER

## 2017-06-05 NOTE — PROGRESS NOTES
[facility]    Chief Complaint   Patient presents with     [facility]       HPI:    Marce Segura is a 89 year old  (3/12/1923), who is being seen today for a federally mandated E/M visit at [facility].  HPI information obtained from: facility chart records, facility staff, patient report and [facility] chart review. Today's concerns are:    Chronic diastolic congestive heart failure (H)  Per history, per epic notes with increased shortness of breath at rest in April.  Goal of care comfort--daughter declined O2 given tripping hazard.  Patient's sats were monitored at that time on r.a and >90%.    Today patient denies shortness of breath, appears comfortable during encounter and talking throughout visit.  Nursing staff without concerns.  No reports of chest pain, orthopnea, PND.  Patient with chronic anxiety that per staff/family generally plays a role in her symptoms.  Last 3 BPs:146/72, 139/69, 149/79    Last echo completed per records from 07/2014   Interpretation Summary  The left ventricle is normal in size. A sigmoid septum is present. There is   mild concentric left ventricular hypertrophy. The visual ejection fraction is   estimated at 60-65%. Grade I left ventricular diastolic dysfunction is noted.   No regional wall motion abnormalities noted. There is sclerosis,   calcification, and restriction of the aortic valve opening compatible with   moderate aortic stenosis.  Compared to echo 2/1/2012, the aortic valve   stenosis is stable.    Wt Readings from Last 3 Encounters:   06/05/17 98 lb 9.6 oz (44.7 kg)   04/24/17 99 lb 9.6 oz (45.2 kg)   04/21/17 99 lb 3.2 oz (45 kg)       Paroxysmal atrial fibrillation (H)  Aortic valve stenosis, unspecified etiology  Noted when hospitalized August 2015, very symptomatic, RVR.  Started on low dose amiodarone.  Has remained in NSR since.  Cardiologist Dr. Holloway, recommendeds to continue  ammiodarone given patient's underlying aortic stenosis, as she may become very symptomatic.  Last seen by cardiology in December 2015--follow up only recommended if patient symptomatic.  Due to age and goals of care patient is anticoagulated with baby aspirin. Her rate has been controlled HR 68-82, she denies shortness of breath, palpitations, chest pain, dizziness or lightheadedness.  Labs closely monitored while on amiodarone       Hypothyroidism due to medication  On synthroid, currently at 100mcg daily.  Patient is on amiodarone, for history of a-fib with aortic stenosis, likely cause of hypothyroidism.  Patient denies/no reports of constipation, changes in hair or nails, temperature intolerance. Last TSH from May 2017 1.41      Anxiety  Chronic anxiety, disruptive to patient, family was agreeable to start Zoloft, began December 2015.  Staff and family feel has been helpful for patient, no adverse effects currently noted.  Goal of care comfort.  Today patient denies anxiety, sleeping well.  Still becomes anxious at times and can exacerbate her symptoms and increase her shortness of breath, has been stable this past month     Bunion, left  Chronic bunion noted to left foot.  Previously bothersome to patient and rubbing on shoe.  Recommended to pad with foam dressing and use lambs wool in shoe.  Patient reports has not been bothering her as much lately, no recent reports of patient complaint from staff.  Also with callus noted to bottom of pinkie toe.  Has not been seen by podiatry lately     Atrophic vaginitis  Patent with frequent urination, bothersome to her and worsening.  Exam revealed atrophic vaginitis, started on Premarin cream.  Per staff has been effective for patient.  Less frequency and urge to.  Denies/no reports of fever, chills, dysuria, pain or discharge from vaginal area, denies feeling of pressure, itching or dryness, VSS.  Limits caffeine intake to one cup of coffee per day.  Wears depends and  pad during the day.          Current Weight: 98.6 lbs    ALLERGIES: No known allergies  PAST MEDICAL HISTORY:  has a past medical history of Aortic stenosis; Atrial fibrillation (H); Back pain; Congestive heart failure (H); Dementia without behavioral disturbance; Hyperkalemia; Hypernatremia; Lumbar compression fracture (H); Osteoporosis; and Subarachnoid hemorrhage (H) (8/10/15).  PAST SURGICAL HISTORY:  has a past surgical history that includes neck injury (years ago); Sigmoidoscopy flexible (12/19/2011); and lower back surgery.  FAMILY HISTORY: family history includes CANCER in her sister and sister; DIABETES in her father and mother; Hypertension in her brother.  SOCIAL HISTORY:  reports that she has never smoked. She has never used smokeless tobacco. She reports that she does not drink alcohol or use illicit drugs.    MEDICATIONS:  Current Outpatient Prescriptions   Medication Sig Dispense Refill     HYDROmorphone (DILAUDID) 2 MG tablet Take 0.5 mg by mouth daily        HYDROmorphone (DILAUDID) 2 MG tablet Take 0.5 mg by mouth daily as needed for moderate to severe pain       diclofenac (VOLTAREN) 1 % GEL Place 2 g onto the skin 4 times daily        diclofenac (VOLTAREN) 1 % GEL Place 2 g onto the skin 2 times daily as needed for moderate pain       levothyroxine (SYNTHROID,LEVOTHROID) 100 MCG tablet Take 100 mcg by mouth daily       conjugated estrogens (PREMARIN) vaginal cream Place 0.5 g vaginally twice a week On Monday and Thursday       witch hazel-glycerin (TUCKS) pad Apply 1 each topically as needed for hemorrhoids       acetaminophen (TYLENOL) 500 MG tablet Take 1,000 mg by mouth as needed for mild pain       acetaminophen (TYLENOL) 500 MG tablet Take 1,000 mg by mouth 3 times daily       pramox-pe-glycerin-petrolatum (PREPARATION H) 1-0.25-14.4-15 % CREA Place rectally 4 times daily as needed for hemorrhoids       sertraline (ZOLOFT) 25 MG tablet Take 25 mg by mouth daily       amiodarone  "(PACERONE/CODARONE) 200 MG tablet Take 0.5 tablets (100 mg) by mouth daily 60 tablet 4     aspirin 81 MG chewable tablet Take 81 mg by mouth daily       ipratropium - albuterol 0.5 mg/2.5 mg/3 mL (DUONEB) 0.5-2.5 (3) MG/3ML nebulization Take 1 vial by nebulization every 6 hours as needed for shortness of breath / dyspnea or wheezing AND give TID until 8/1/2016 then go back to q 6 hrs PRN       senna-docusate (SENOKOT-S;PERICOLACE) 8.6-50 MG per tablet Take 1 tablet by mouth 2 times daily        bisacodyl (DULCOLAX) 10 MG suppository Place 10 mg rectally daily as needed for constipation       Calcium Carbonate-Vitamin D (CALCIUM + D) 600-200 MG-UNIT per tablet Take 1 tablet by mouth 2 times daily.       Multiple Vitamin (MULTIVITAMIN) per tablet Take 1 tablet by mouth daily.       Medications reviewed:  Medications reconciled to facility chart and changes were made to reflect current medications as identified as above med list. Below are the changes that were made:   Medications stopped since last EPIC medication reconciliation:   Medications Discontinued During This Encounter   Medication Reason     magnesium hydroxide (MILK OF MAGNESIA) 400 MG/5ML suspension Medication Reconciliation Clean Up       Medications started since last [facility] medication reconciliation:  No orders of the defined types were placed in this encounter.        Case Management:  I have reviewed the care plan and MDS and do agree with the plan. Patient's desire to return to the community is not present.  Information reviewed:  Medications, vital signs, orders, and nursing notes.    ROS:  Unobtainable secondary to cognitive impairment or aphasia, but today pt reports feeling good, denies pain and shortness of breath     Exam:  Vitals: /72  Pulse 81  Temp 97.8  F (36.6  C)  Resp 17  Ht 5' 2\" (1.575 m)  Wt 98 lb 9.6 oz (44.7 kg)  SpO2 98%  BMI 18.03 kg/m2  BMI= Body mass index is 18.03 kg/(m^2).  GENERAL APPEARANCE:  Alert, in no " distress  EYES:  EOM, conjunctivae, lids, pupils and irises normal  NECK:  No adenopathy,masses or thyromegaly  RESP:  respiratory effort and palpation of chest normal, lungs clear to auscultation, diminished to bilateral bases, no respiratory distress, able to hold conversation without becoming short of breath   CV:  Palpation and auscultation of heart done , no edema, +2 pedal pulses, rate-normal, grade 2/6 murmur  ABDOMEN:  normal bowel sounds, soft, nontender, no hepatosplenomegaly or other masses  M/S:   Gait and station abnormal uses walker for ambulation, kyphosis, no edema/erythema of joints, CMS intact, bunion to left foot, callus noted under pinkie toe  SKIN:  Inspection of skin and subcutaneous tissue baseline  NEURO:   Cranial nerves 2-12 are normal tested and grossly at patient's baseline, speech clear, no facial asymmetry   PSYCH:  oriented to person, place, insight and judgement impaired, memory impaired , affect and mood normal, no anxiety observed during encounter           Lab/Diagnostic data:  Please see epic HIM results under 5/25/17        ASSESSMENT/PLAN  (I50.32) Chronic diastolic congestive heart failure (H)  (primary encounter diagnosis)  Comment: Chronic, currently compensated  Plan: -Weekly weights, nursing to monitor and update NP if >5lbs per day or 5lbs per week or symptomatic (shortness of breath, increased edema, crackles)    -BMP Q 3 months to monitor electrolytes   -Low NA diet       (I48.0) Paroxysmal atrial fibrillation (H)  Comment: Per history   Plan: per cardiology's recommendation continue low dose amiodarone  -monitor BMP, Mg, LFT, TSH     (E03.2) Hypothyroidism due to medication  Comment: Chronic stable  Plan: TSH on low side of normal--could contribute to increased anxiety, and prefer to keep on higher end of normal given her age, decrease  -decrease to 88mcg  -TSH recheck in 6 weeks    (F41.9) Anxiety  Comment: Chronic, stable, can exacerbate her symptoms and shortness of  breath  Plan: continue Zoloft, has been beneficial and benefits outweigh risks at this time.  Goal of care comfort  -as above decreasing synthroid--may benefit from TSH on higher end of normal    (M21.612) Bunion, left  Comment: Chronic, decreased pain  Plan: continue to pad with foam, use lambs wool in shoe, check that shoes are not rubbing  -podiatry at next visit     (I35.0) Aortic valve stenosis, unspecified etiology  Comment: chronic, stable  Plan: continue amiodarone and labs monitoring as noted above    (N95.2) Atrophic vaginitis  Comment: chronic, improved with permarin   Plan: continue current POC        Total time spent with patient visit at the skilled nursing facility was 35 min including patient visit and review of past records. Greater than 50% of total time spent with counseling and coordinating care    Electronically signed by:  JH Hogan CNP

## 2017-07-17 ENCOUNTER — TRANSFERRED RECORDS (OUTPATIENT)
Dept: HEALTH INFORMATION MANAGEMENT | Facility: CLINIC | Age: 82
End: 2017-07-17

## 2017-07-17 LAB — TSH SERPL-ACNC: 2.39 UIU/ML (ref 0.3–5)

## 2017-08-16 VITALS
HEIGHT: 62 IN | TEMPERATURE: 97.9 F | OXYGEN SATURATION: 97 % | WEIGHT: 98.6 LBS | DIASTOLIC BLOOD PRESSURE: 69 MMHG | HEART RATE: 98 BPM | RESPIRATION RATE: 14 BRPM | SYSTOLIC BLOOD PRESSURE: 122 MMHG | BODY MASS INDEX: 18.14 KG/M2

## 2017-08-17 ENCOUNTER — NURSING HOME VISIT (OUTPATIENT)
Dept: GERIATRICS | Facility: CLINIC | Age: 82
End: 2017-08-17
Payer: COMMERCIAL

## 2017-08-17 DIAGNOSIS — I35.0 NONRHEUMATIC AORTIC VALVE STENOSIS: ICD-10-CM

## 2017-08-17 DIAGNOSIS — I50.32 CHRONIC DIASTOLIC CONGESTIVE HEART FAILURE (H): ICD-10-CM

## 2017-08-17 DIAGNOSIS — G30.1 LATE ONSET ALZHEIMER'S DISEASE WITHOUT BEHAVIORAL DISTURBANCE (H): ICD-10-CM

## 2017-08-17 DIAGNOSIS — I48.20 CHRONIC ATRIAL FIBRILLATION (H): Primary | ICD-10-CM

## 2017-08-17 DIAGNOSIS — M81.0 SENILE OSTEOPOROSIS: ICD-10-CM

## 2017-08-17 DIAGNOSIS — F43.22 ADJUSTMENT DISORDER WITH ANXIOUS MOOD: ICD-10-CM

## 2017-08-17 DIAGNOSIS — E03.9 HYPOTHYROIDISM, UNSPECIFIED TYPE: ICD-10-CM

## 2017-08-17 DIAGNOSIS — F02.80 LATE ONSET ALZHEIMER'S DISEASE WITHOUT BEHAVIORAL DISTURBANCE (H): ICD-10-CM

## 2017-08-17 PROCEDURE — 99207 ZZC CDG-CORRECTLY CODED, REVIEWED AND AGREE: CPT | Performed by: INTERNAL MEDICINE

## 2017-08-17 PROCEDURE — 99309 SBSQ NF CARE MODERATE MDM 30: CPT | Performed by: INTERNAL MEDICINE

## 2017-08-17 NOTE — PROGRESS NOTES
"Marce Segura is a 89 year old female seen August 17, 2017 at [facility] where she has resided for 2 years (admit 9/2015) seen to follow up atrial fibrillation, chronic diastolic CHF and dementia.   Patient is seen in her room, where she is getting ready to lie down after lunch.     States she \"feels like an old woman\"   Not able to articulate any specific symptoms, but dosen't feel well in general.    No new concerns reported by nursing staff.    Patient has diastolic CHF with LVH and mod AS on last ECHO.    She had an episode of afib during a 2015 hospitalization and has been maintained on low dose amiodarone since that time.     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   She suffered a right wrist fracture a year ago and remains on scheduled hydromorphone.         Past Medical History:   Diagnosis Date     Aortic stenosis     7/2014 Mod AS per Echo     Atrial fibrillation (H)      Back pain      Congestive heart failure (H)      Dementia without behavioral disturbance      Hyperkalemia      Hypernatremia      Lumbar compression fracture (H)      Osteoporosis      Subarachnoid hemorrhage (H) 8/10/15    C1 vertebra     SH:  , son Abraham is first contact.   She also has a daughter Rae Ndiaye    ROS:  Limited, as above.     EXAM:  Frail, NAD  /69  Pulse 98  Temp 97.9  F (36.6  C)  Resp 14  Ht 5' 2\" (1.575 m)  Wt 98 lb 9.6 oz (44.7 kg)  SpO2 97%  BMI 18.03 kg/m2   Neck supple without adeanopathy  Lungs decreased BS, no rales or wheeze  Heart irreg irreg, 2/6 NATHEN  Abd soft, NT, no distention, +BS  Ext without edema.  Mild deforming changes of OA.    Neuro: limited verbal skills, STML  Psych: affect okay.      Last Basic Metabolic Panel:  Lab Results   Component Value Date     05/25/2017      Lab Results   Component Value Date    POTASSIUM 3.7 05/25/2017     Lab Results   Component Value Date    CHLORIDE 104 05/25/2017     Lab " Results   Component Value Date    MARIANO 9.2 05/25/2017     Lab Results   Component Value Date    CO2 29 05/25/2017     Lab Results   Component Value Date    BUN 20 05/25/2017     Lab Results   Component Value Date    CR 0.70 05/25/2017     Lab Results   Component Value Date    GLC 78 05/25/2017     TSH   Date Value Ref Range Status   07/17/2017 2.39 0.30 - 5.00 uIU/mL Final       IMP/PLAN:  (I48.2) Chronic atrial fibrillation (H)    Comment: irreg today, may no longer be achieving any rhythm control.   VR okay on amiodarone   Plan: daily ASA    (I35.0) Nonrheumatic aortic valve stenosis  (I50.32) Chronic diastolic congestive heart failure (H)  Comment: stable volume status, no apparent CV sx  Plan: continue to monitor volume status; goal is comfort       (G30.1,  F02.80) Late onset Alzheimer's disease without behavioral disturbance  Comment: with anxious features  Plan: LTC support for assist with mobility, transfers, meds, meals, activity   Continue sertraline, which has helped with her anxiety    (E03.9) Hypothyroidism, unspecified type  Comment: on replacement  Plan: monitor TFTs while on amiodarone    (M81.0) Senile osteoporosis  Comment: with recent fx  Plan: continue calcium, vit D and current pain regimen      Mayte Arellano MD

## 2017-08-21 ENCOUNTER — NURSING HOME VISIT (OUTPATIENT)
Dept: GERIATRICS | Facility: CLINIC | Age: 82
End: 2017-08-21
Payer: COMMERCIAL

## 2017-08-21 VITALS
HEART RATE: 77 BPM | TEMPERATURE: 97.9 F | DIASTOLIC BLOOD PRESSURE: 69 MMHG | HEIGHT: 62 IN | RESPIRATION RATE: 14 BRPM | BODY MASS INDEX: 18.14 KG/M2 | SYSTOLIC BLOOD PRESSURE: 122 MMHG | OXYGEN SATURATION: 97 % | WEIGHT: 98.6 LBS

## 2017-08-21 DIAGNOSIS — M21.612 BUNION, LEFT: ICD-10-CM

## 2017-08-21 DIAGNOSIS — I35.0 AORTIC VALVE STENOSIS, UNSPECIFIED ETIOLOGY: ICD-10-CM

## 2017-08-21 DIAGNOSIS — E03.2 HYPOTHYROIDISM DUE TO MEDICATION: ICD-10-CM

## 2017-08-21 DIAGNOSIS — F02.80 LATE ONSET ALZHEIMER'S DISEASE WITHOUT BEHAVIORAL DISTURBANCE (H): ICD-10-CM

## 2017-08-21 DIAGNOSIS — B37.31 YEAST INFECTION OF THE VAGINA: ICD-10-CM

## 2017-08-21 DIAGNOSIS — I50.32 CHRONIC DIASTOLIC CONGESTIVE HEART FAILURE (H): Primary | ICD-10-CM

## 2017-08-21 DIAGNOSIS — I48.0 PAROXYSMAL ATRIAL FIBRILLATION (H): ICD-10-CM

## 2017-08-21 DIAGNOSIS — M15.9 OSTEOARTHRITIS OF MULTIPLE JOINTS, UNSPECIFIED OSTEOARTHRITIS TYPE: ICD-10-CM

## 2017-08-21 DIAGNOSIS — G30.1 LATE ONSET ALZHEIMER'S DISEASE WITHOUT BEHAVIORAL DISTURBANCE (H): ICD-10-CM

## 2017-08-21 DIAGNOSIS — I10 ESSENTIAL HYPERTENSION: ICD-10-CM

## 2017-08-21 DIAGNOSIS — F43.23 ADJUSTMENT DISORDER WITH MIXED ANXIETY AND DEPRESSED MOOD: ICD-10-CM

## 2017-08-21 DIAGNOSIS — M81.0 OSTEOPOROSIS WITHOUT CURRENT PATHOLOGICAL FRACTURE, UNSPECIFIED OSTEOPOROSIS TYPE: ICD-10-CM

## 2017-08-21 DIAGNOSIS — N95.2 ATROPHIC VAGINITIS: ICD-10-CM

## 2017-08-21 PROCEDURE — 99310 SBSQ NF CARE HIGH MDM 45: CPT | Performed by: NURSE PRACTITIONER

## 2017-08-28 ENCOUNTER — TRANSFERRED RECORDS (OUTPATIENT)
Dept: HEALTH INFORMATION MANAGEMENT | Facility: CLINIC | Age: 82
End: 2017-08-28

## 2017-08-28 LAB
ALBUMIN SERPL-MCNC: 2.9 G/DL (ref 3.5–5)
ALP SERPL-CCNC: 43 U/L (ref 45–120)
ALT SERPL-CCNC: 6 U/L (ref 0–45)
ANION GAP SERPL CALCULATED.3IONS-SCNC: 8 MMOL/L (ref 5–18)
AST SERPL-CCNC: 14 U/L (ref 0–40)
BILIRUB SERPL-MCNC: 0.3 MG/DL (ref 0–1)
BUN SERPL-MCNC: 19 MG/DL (ref 8–28)
CALCIUM SERPL-MCNC: 8.8 MG/DL (ref 8.5–10.5)
CHLORIDE SERPLBLD-SCNC: 105 MMOL/L (ref 98–107)
CO2 SERPL-SCNC: 28 MMOL/L (ref 22–31)
CREAT SERPL-MCNC: 0.63 MG/DL (ref 0.6–1.1)
GFR SERPL CREATININE-BSD FRML MDRD: >60 ML/MIN/1.73M2
GLUCOSE SERPL-MCNC: 74 MG/DL (ref 70–125)
POTASSIUM SERPL-SCNC: 3.9 MMOL/L (ref 3.5–5)
PROT SERPL-MCNC: 6.2 G/DL (ref 6–8)
SODIUM SERPL-SCNC: 141 MMOL/L (ref 136–145)
TSH SERPL-ACNC: 1.85 UIU/ML (ref 0.3–5)

## 2017-10-16 ENCOUNTER — NURSING HOME VISIT (OUTPATIENT)
Dept: GERIATRICS | Facility: CLINIC | Age: 82
End: 2017-10-16
Payer: COMMERCIAL

## 2017-10-16 VITALS
RESPIRATION RATE: 16 BRPM | TEMPERATURE: 98.7 F | BODY MASS INDEX: 17.48 KG/M2 | OXYGEN SATURATION: 96 % | HEIGHT: 62 IN | SYSTOLIC BLOOD PRESSURE: 131 MMHG | WEIGHT: 95 LBS | DIASTOLIC BLOOD PRESSURE: 70 MMHG | HEART RATE: 76 BPM

## 2017-10-16 DIAGNOSIS — E03.2 HYPOTHYROIDISM DUE TO MEDICATION: ICD-10-CM

## 2017-10-16 DIAGNOSIS — F02.80 LATE ONSET ALZHEIMER'S DISEASE WITHOUT BEHAVIORAL DISTURBANCE (H): ICD-10-CM

## 2017-10-16 DIAGNOSIS — I48.0 PAROXYSMAL ATRIAL FIBRILLATION (H): ICD-10-CM

## 2017-10-16 DIAGNOSIS — F43.22 ADJUSTMENT DISORDER WITH ANXIOUS MOOD: ICD-10-CM

## 2017-10-16 DIAGNOSIS — I50.32 CHRONIC DIASTOLIC CONGESTIVE HEART FAILURE (H): Primary | ICD-10-CM

## 2017-10-16 DIAGNOSIS — I35.0 AORTIC VALVE STENOSIS, UNSPECIFIED ETIOLOGY: ICD-10-CM

## 2017-10-16 DIAGNOSIS — G30.1 LATE ONSET ALZHEIMER'S DISEASE WITHOUT BEHAVIORAL DISTURBANCE (H): ICD-10-CM

## 2017-10-16 DIAGNOSIS — M15.9 OSTEOARTHRITIS OF MULTIPLE JOINTS, UNSPECIFIED OSTEOARTHRITIS TYPE: ICD-10-CM

## 2017-10-16 PROCEDURE — 99309 SBSQ NF CARE MODERATE MDM 30: CPT | Performed by: NURSE PRACTITIONER

## 2017-10-16 RX ORDER — MENTHOL AND METHYL SALICYLATE 10; 30 G/100G; G/100G
CREAM TOPICAL 3 TIMES DAILY
COMMUNITY

## 2017-10-16 NOTE — PROGRESS NOTES
[place] [facility]    Chief Complaint   Patient presents with     skilled nursing Regulatory       HPI:    Marce Segura is a 89 year old  (3/12/1923), who is being seen today for a federally mandated E/M visit at [facility].  HPI information obtained from: facility chart records, facility staff, patient report, [facility] chart review and family/first contact daughter, Rae Ndiaye, report. Today's concerns are:    Chronic diastolic congestive heart failure (H)  Per history, goal of care comfort.       Today patient denies shortness of breath, appears comfortable during encounter and talking throughout visit.  Nursing staff without concerns.  No reports of chest pain, orthopnea, PND.  Patient with chronic anxiety that per staff/family generally plays a role in her symptoms.  Last echo from 2014 with EF of 60%-65%.  No longer following with cards due to goals of care, advancing dementia with difficulty getting to apts.  No recent weight gain.  Last 3 BPs:131/70, 137/73, 136/69    Wt Readings from Last 3 Encounters:   10/16/17 95 lb (43.1 kg)   08/21/17 98 lb 9.6 oz (44.7 kg)   08/16/17 98 lb 9.6 oz (44.7 kg)        Paroxysmal atrial fibrillation (H)  Aortic valve stenosis, unspecified etiology  Noted when hospitalized August 2015, very symptomatic, RVR.  Started on low dose amiodarone.  Cardiologist Dr. Holloway, recommendeds to continue ammiodarone given patient's underlying aortic stenosis, as she may become very symptomatic.  Last seen by cardiology in December 2015--follow up only recommended if patient symptomatic---as above with increased difficulty leaving for apts.  Due to age and goals of care patient is anticoagulated with baby aspirin. Her rate has been controlled HR 62-88, she denies shortness of breath, palpitations, chest pain, dizziness or lightheadedness.  Labs closely monitored while on amiodarone     Adjustment disorder with anxious mood  Chronic  "anxiety, disruptive to patient, family was agreeable to start Zoloft, began December 2015.  Staff and family feel has been helpful for patient, no adverse effects currently noted.  Goal of care comfort.  Today patient denies anxiety, sleeping well.  Still becomes anxious at times and can exacerbate her symptoms and increase her shortness of breath, has been stable.  Family desires to continue Zoloft      Late onset Alzheimer's disease without behavioral disturbance  Primary reason for LTC, with recent gradual progressive decline-----increasing lethargy, increase in forgetfulness/confusion, not wanting to participate in activities at NH.  Starting last Wednesday-Friday refusing to get out of bed, get dressed, refusing to eat.  Improved on Saturday and over weekend.  Today patient reports doing well, staff state she \"perked back up over the weekend.\"  Family is aware, goal of care is comfort.  VSS, afebrile, denies/no reports of chest pain, shortness of breath, abdominal pain, emesis.  Weight has been stable, no reports of behaviors     Wt Readings from Last 3 Encounters:   10/16/17 95 lb (43.1 kg)   08/21/17 98 lb 9.6 oz (44.7 kg)   08/16/17 98 lb 9.6 oz (44.7 kg)       Hypothyroidism due to medication  On synthroid, currently at 88mcg daily.  Patient is on amiodarone, for history of a-fib with aortic stenosis, likely cause of hypothyroidism.  Patient denies/no reports of constipation, changes in hair or nails, temperature intolerance.   Lab Results   Component Value Date    TSH 1.85 08/28/2017         Osteoarthritis of multiple joints, unspecified osteoarthritis type  No recent falls, ambulates with walker, denies fear of falling.  On calcium with vitamin D supplementation.  Continues to have pain to right wrist after fall in 2016 and fx.  Per xray and orthopedic PA, likely r/t more to advanced osteoarthritis.  Family agreeable to dilaudid Q morning for comfort.  Has worked well in managing pain in addition to her " scheduled tylenol, icy hot and QD prn dilaudid.  Patient has not required prn dose since July.  Family desires to continue in case patient needs, goal of care comfort.         Current Weight: 95 lbs      ALLERGIES: No known allergies  PAST MEDICAL HISTORY:  has a past medical history of Aortic stenosis; Atrial fibrillation (H); Back pain; Congestive heart failure (H); Dementia without behavioral disturbance; Hyperkalemia; Hypernatremia; Lumbar compression fracture (H); Osteoporosis; and Subarachnoid hemorrhage (H) (8/10/15).  PAST SURGICAL HISTORY:  has a past surgical history that includes neck injury (years ago); Sigmoidoscopy flexible (12/19/2011); and lower back surgery.  FAMILY HISTORY: family history includes CANCER in her sister and sister; DIABETES in her father and mother; Hypertension in her brother.  SOCIAL HISTORY:  reports that she has never smoked. She has never used smokeless tobacco. She reports that she does not drink alcohol or use illicit drugs.    MEDICATIONS:  Current Outpatient Prescriptions   Medication Sig Dispense Refill     Menthol-Methyl Salicylate (ICY HOT EXTRA STRENGTH) 10-30 % CREA Externally apply topically 3 times daily       Levothyroxine Sodium (SYNTHROID PO) Take 88 mcg by mouth       HYDROmorphone (DILAUDID) 2 MG tablet Take 0.5 mg by mouth daily        HYDROmorphone (DILAUDID) 2 MG tablet Take 0.5 mg by mouth daily as needed for moderate to severe pain       conjugated estrogens (PREMARIN) vaginal cream Place 0.5 g vaginally twice a week On Monday and Thursday       witch hazel-glycerin (TUCKS) pad Apply 1 each topically as needed for hemorrhoids       acetaminophen (TYLENOL) 500 MG tablet Take 1,000 mg by mouth as needed for mild pain       acetaminophen (TYLENOL) 500 MG tablet Take 1,000 mg by mouth 3 times daily       pramox-pe-glycerin-petrolatum (PREPARATION H) 1-0.25-14.4-15 % CREA Place rectally 4 times daily as needed for hemorrhoids       sertraline (ZOLOFT) 25 MG  tablet Take 25 mg by mouth daily       amiodarone (PACERONE/CODARONE) 200 MG tablet Take 0.5 tablets (100 mg) by mouth daily 60 tablet 4     aspirin 81 MG chewable tablet Take 81 mg by mouth daily       ipratropium - albuterol 0.5 mg/2.5 mg/3 mL (DUONEB) 0.5-2.5 (3) MG/3ML nebulization Take 1 vial by nebulization every 6 hours as needed for shortness of breath / dyspnea or wheezing AND give TID until 8/1/2016 then go back to q 6 hrs PRN       senna-docusate (SENOKOT-S;PERICOLACE) 8.6-50 MG per tablet Take 1 tablet by mouth 2 times daily        bisacodyl (DULCOLAX) 10 MG suppository Place 10 mg rectally daily as needed for constipation       Calcium Carbonate-Vitamin D (CALCIUM + D) 600-200 MG-UNIT per tablet Take 1 tablet by mouth 2 times daily.       Multiple Vitamin (MULTIVITAMIN) per tablet Take 1 tablet by mouth daily.       Medications reviewed:  Medications reconciled to facility chart and changes were made to reflect current medications as identified as above med list. Below are the changes that were made:   Medications stopped since last EPIC medication reconciliation:   Medications Discontinued During This Encounter   Medication Reason     diclofenac (VOLTAREN) 1 % GEL Medication Reconciliation Clean Up     diclofenac (VOLTAREN) 1 % GEL Medication Reconciliation Clean Up       Medications started since last [facility] medication reconciliation:  Orders Placed This Encounter   Medications     Menthol-Methyl Salicylate (ICY HOT EXTRA STRENGTH) 10-30 % CREA     Sig: Externally apply topically 3 times daily         Case Management:  I have reviewed the care plan and MDS and do agree with the plan. Patient's desire to return to the community is not present.  Information reviewed:  Medications, vital signs, orders, and nursing notes.    ROS:  10 point ROS of systems including Constitutional, Eyes, Respiratory, Cardiovascular, Gastroenterology, Genitourinary, Integumentary, Muscularskeletal, Psychiatric were all  "negative except for pertinent positives noted in my HPI.    Exam:  Vitals: /70  Pulse 76  Temp 98.7  F (37.1  C)  Resp 16  Ht 5' 2\" (1.575 m)  Wt 95 lb (43.1 kg)  SpO2 96%  BMI 17.38 kg/m2  BMI= Body mass index is 17.38 kg/(m^2).  GENERAL APPEARANCE:  Alert, in no distress  EYES:  EOM, conjunctivae, lids, pupils and irises normal  NECK:  No adenopathy,masses or thyromegaly  RESP:  respiratory effort and palpation of chest normal, lungs clear to auscultation---diminished throughout, no respiratory distress  CV:  Palpation and auscultation of heart done , no edema, +2 pedal pulses, rate-normal, grade 2/6 murmur  ABDOMEN:  normal bowel sounds, soft, nontender, no hepatosplenomegaly or other masses  M/S:   Gait and station abnormal uses walker for ambulation, kyphosis, no edema/erythema of joints, CMS intact   Digits and nails normal  NEURO:   Cranial nerves 2-12 are normal tested and grossly at patient's baseline, speech clear, no facial asymmetry   PSYCH:  oriented to person, place, insight and judgement impaired, memory impaired , affect and mood normal, no anxiety observed during encounter        Lab/Diagnostic data:      Last Basic Metabolic Panel:  Recent Labs   Lab Test 08/28/17 05/25/17   NA  141  142   POTASSIUM  3.9  3.7   CHLORIDE  105  104   MARIANO  8.8  9.2   CO2  28  29   BUN  19  20   CR  0.63  0.70   GLC  74  78       Liver Function Studies -   Recent Labs   Lab Test 08/28/17 05/25/17   PROTTOTAL  6.2  7.1   ALBUMIN  2.9*  3.2*   BILITOTAL  0.3  0.3   ALKPHOS  43*  49   AST  14  16   ALT  6  8       TSH   Date Value Ref Range Status   08/28/2017 1.85 0.30 - 5.00 uIU/mL Final   07/17/2017 2.39 0.30 - 5.00 uIU/mL Final       ASSESSMENT/PLAN  (I50.32) Chronic diastolic congestive heart failure (H)  (primary encounter diagnosis)  Comment: Chronic, currently compensated  Plan: -Weekly weights, nursing to monitor and update NP if >5lbs per day or 5lbs per week or symptomatic (shortness of breath, " increased edema, crackles)    -BMP Q 3 months to monitor electrolytes       (I48.0) Paroxysmal atrial fibrillation (H)  Comment: Per history   Plan: per cardiology's recommendation continue low dose amiodarone  -monitor BMP, Mg, LFT, TSH     (I35.0) Aortic valve stenosis, unspecified etiology  Comment: chronic, stable  Plan: continue amiodarone and labs monitoring as noted above    (F43.22) Adjustment disorder with anxious mood  Comment: Chronic, stable, can exacerbate her symptoms and shortness of breath  Plan: continue Zoloft, has been beneficial and benefits outweigh risks at this time.  Goal of care comfort    (G30.1,  F02.80) Late onset Alzheimer's disease without behavioral disturbance  Comment: Chronic and progressive, expect continual decline in function  Plan: appropriate for LTC---staff assist with cares  -Goal of care comfort     (E03.2) Hypothyroidism due to medication  Comment: Chronic, stable   Plan: continue synthroid  -Monitor TSH    (M15.9) Osteoarthritis of multiple joints, unspecified osteoarthritis type  Comment: Chronic, pain well controlled  Plan: continue calcium and vitamin D supplementation  -fall precautions  -Scheduled APAP and icy hot    -continue dilaudid, has been effective in managing pain, patient tolerating well, goal of care comfort, family agreeable to continue.  Benefits outweigh risks          Total time spent with patient visit at the skilled nursing facility was 25 min including patient visit, review of past records and phone call to patient contact, daughter, Rae Ndiaye. Greater than 50% of total time spent with counseling and coordinating care     Electronically signed by:  HJ Hogan CNP

## 2017-12-01 NOTE — TELEPHONE ENCOUNTER
[place] [facility] TELEPHONE ENCOUNTER    Chief Complaint   Patient presents with     fall with lethargy       Marce Segura is a 89 year old  (3/12/1923),Nurse called today to report: has crackles, more lethargic, cough, and reporting increased back pain.  HX of thoracic and lumbar vertebral fractures. Temp is 98.9 which is above her normal, BP 92/51    ASSESSMENT/PLAN  CHF VS PNEUMONIA    Start Lasix 20 mg po  Daily x 5 days    Schedule DuoNeb TID     STAT CXR, BMP, CBC    Courtney Rogers, APRN CNP

## 2017-12-04 NOTE — PROGRESS NOTES
[place] [facility]    Chief Complaint   Patient presents with     Nursing Home Acute       HPI:    Marce Segura is a 89 year old  (3/12/1923), who is being seen today for an episodic care visit at [facility].  HPI information obtained from: facility chart records, facility staff, patient report and [facility] chart review.Today's concern is:    Acute left-sided thoracic back pain  Fall, initial encounter  Lethargy  Chronic diastolic congestive heart failure (H)  Patient seen today after on-call was notified on Friday that patient with acute back pain after sustaining a fall and increased lethargy with elevated temp from baseline.  CBC, BMP, chest x-rays and x-rays of back ordered at that time. Chest-xray without evidence of pneumonia, questionable CHF exacerbation, lasix and neb treatments ordered X 5 days.  X-ray of back without acute findings, multiple old compression fx, patient with history of osteoporosis.    Today patient reports feeling much better, still not 100%.  Her vitals have been stable, afebrile, sating well on room air, no tachycardia or hypotension.  She reports her breathing feels more comfortable, she at times still feels a little short of breath with exertion.  Reports a sore/hoarse throat.  She denies orthopnea, PND, remains on room air and sating well, does not appear dyspneic with conversation, no LE edema.  She remains alert during our encounter.  No recent weight gain    Continues to report mid left-sided back pain, denies radiation.  Feels better with rest and when in bed.  During our encounter rates pain 0/10, can get up to 7/10 at times with movement and walking.  Denies/no report of changes in sensation, changes in bowels.  She has scheduled icy hot, APAP, dilaudid and QDprn dilaudid.  Patient's goals of care are focus on comfort    Called patient's daughter/healthcare agent, Rae Ndiaye, to discuss patient and POC, no answer, left  message    Wt Readings from Last 3 Encounters:   12/04/17 92 lb 12.8 oz (42.1 kg)   10/16/17 95 lb (43.1 kg)   08/21/17 98 lb 9.6 oz (44.7 kg)         ALLERGIES: No known allergies  Past Medical, Surgical, Family and Social History reviewed and updated in [facility].    Current Outpatient Prescriptions   Medication Sig Dispense Refill     Menthol-Methyl Salicylate (ICY HOT EXTRA STRENGTH) 10-30 % CREA Externally apply topically 3 times daily       Levothyroxine Sodium (SYNTHROID PO) Take 88 mcg by mouth       HYDROmorphone (DILAUDID) 2 MG tablet Take 0.5 mg by mouth daily        HYDROmorphone (DILAUDID) 2 MG tablet Take 0.5 mg by mouth daily as needed for moderate to severe pain       conjugated estrogens (PREMARIN) vaginal cream Place 0.5 g vaginally twice a week On Monday and Thursday       witch hazel-glycerin (TUCKS) pad Apply 1 each topically as needed for hemorrhoids       acetaminophen (TYLENOL) 500 MG tablet Take 1,000 mg by mouth as needed for mild pain       acetaminophen (TYLENOL) 500 MG tablet Take 1,000 mg by mouth 3 times daily       pramox-pe-glycerin-petrolatum (PREPARATION H) 1-0.25-14.4-15 % CREA Place rectally 4 times daily as needed for hemorrhoids       sertraline (ZOLOFT) 25 MG tablet Take 25 mg by mouth daily       amiodarone (PACERONE/CODARONE) 200 MG tablet Take 0.5 tablets (100 mg) by mouth daily 60 tablet 4     aspirin 81 MG chewable tablet Take 81 mg by mouth daily       ipratropium - albuterol 0.5 mg/2.5 mg/3 mL (DUONEB) 0.5-2.5 (3) MG/3ML nebulization Take 1 vial by nebulization every 6 hours as needed for shortness of breath / dyspnea or wheezing AND give TID until 8/1/2016 then go back to q 6 hrs PRN       senna-docusate (SENOKOT-S;PERICOLACE) 8.6-50 MG per tablet Take 1 tablet by mouth 2 times daily        bisacodyl (DULCOLAX) 10 MG suppository Place 10 mg rectally daily as needed for constipation       Calcium Carbonate-Vitamin D (CALCIUM + D) 600-200 MG-UNIT per tablet Take 1 tablet by  mouth 2 times daily.       Multiple Vitamin (MULTIVITAMIN) per tablet Take 1 tablet by mouth daily.       Medications reviewed:  Medications reconciled to facility chart and changes were made to reflect current medications as identified as above med list. Below are the changes that were made:   Medications stopped since last EPIC medication reconciliation:   There are no discontinued medications.    Medications started since last [facility] medication reconciliation:  No orders of the defined types were placed in this encounter.        REVIEW OF SYSTEMS:  4 point ROS including Respiratory, CV, GI and , other than that noted in the HPI,  is negative    Physical Exam:  /69  Pulse 72  Temp 97.8  F (36.6  C)  Resp 16  Wt 92 lb 12.8 oz (42.1 kg)  SpO2 96%  BMI 16.97 kg/m2  GENERAL APPEARANCE:  Alert, resting in bed, does not appear to be in distress  RESP:  respiratory effort and palpation of chest normal, lungs clear to auscultation---diminished throughout, no respiratory distress, no cough, on room air, hoarse voice  CV:  Palpation and auscultation of heart done , no edema, +2 pedal pulses, rate-normal, grade 2/6 murmur  ABDOMEN:  normal bowel sounds, soft, nontender, no hepatosplenomegaly or other masses  M/S:   Gait and station abnormal uses walker for ambulation, kyphosis, no edema/erythema of joints, CMS intact   NEURO:   Cranial nerves 2-12 are normal tested and grossly at patient's baseline, speech clear, no facial asymmetry   PSYCH:  oriented to person, place, insight and judgement impaired, memory impaired , affect and mood normal    Recent Labs:    CBC RESULTS:   Recent Labs   Lab Test 11/05/15  09/24/15   1251 08/26/15   WBC  7.0   --   8.1   RBC  3.71*   --   4.12   HGB  11.0*  13.2  11.8   HCT  33.7*   --   36.3   MCV  91   --   88   MCH  29.6   --   28.6   MCHC  32.6   --   32.5   RDW  15.6*   --   15.4   PLT  206   --   239       Last Basic Metabolic Panel:  Recent Labs   Lab Test 08/28/17  05/25/17   NA  141  142   POTASSIUM  3.9  3.7   CHLORIDE  105  104   MARIANO  8.8  9.2   CO2  28  29   BUN  19  20   CR  0.63  0.70   GLC  74  78       Liver Function Studies -   Recent Labs   Lab Test 08/28/17 05/25/17   PROTTOTAL  6.2  7.1   ALBUMIN  2.9*  3.2*   BILITOTAL  0.3  0.3   ALKPHOS  43*  49   AST  14  16   ALT  6  8       TSH   Date Value Ref Range Status   08/28/2017 1.85 0.30 - 5.00 uIU/mL Final   07/17/2017 2.39 0.30 - 5.00 uIU/mL Final   ]    No results found for: A1C        Assessment/Plan:  (M54.6) Acute left-sided thoracic back pain  (primary encounter diagnosis)  Comment: Acute on chronic---no acute findings on x-ray  Plan: monitor, staff update if pain worsens  -focus on comfort, continue rest, APAP, icy hot, dilaudid     (W19.XXXA) Fall, initial encounter  Comment: occurred last week  Plan: physical therapy eval    (R53.83) Lethargy  Comment: reported last week, not noted during encounter, no further reports from staff  Plan: vitals Q shift X 1 week, staff update with concerns     (I50.32) Chronic diastolic congestive heart failure (H)  Comment: possible recent exacerbation  Plan: continue Lasix for full 5 days  -K+ was low on 12/1, give 20meq today and tomorrow, recheck K+ tomorrow   -daily weights, staff update if >2lbs/day, 5lbs/week, increased edema or shortness of breath         Called patient's daughter, Rae Ndiaye, no answer, left VM.  Discussed patient with NH staff, staff today unaware of any of recent events, no concerns from staff recently     Electronically signed by  JH Hogan CNP

## 2017-12-07 NOTE — PROGRESS NOTES
"[place] [facility]    Chief Complaint   Patient presents with     Nursing Home Acute       HPI:    Marce Segura is a 89 year old  (3/12/1923), who is being seen today for an episodic care visit at [facility].    HPI information obtained from: facility chart records, facility staff, patient report and family/first contact daughter, Rae Ndiaye report. Today's concern is:    Acute left-sided thoracic back pain  Seen today for continued acute back pain after a fall last Friday 12/1.  X-rays completed with no acute findings, multiple old compression fx and patient with history of osteoporosis     Continues to report mid left-sided back pain, denies radiation.  Feels better with rest and when in bed.  During our encounter rates pain 0/10, can get up to 7/10 at times with movement and walking and per staff and daughter patient has been very uncomfortable and not wanting to get out of bed due to pain.  Denies/no report of changes in sensation, changes in bowels.  She has scheduled icy hot, APAP, dilaudid scheduled X1/dose per day.  Patient's goals of care are focus on comfort    Called and spoke with patient's healthcare agent, daughter, Rae Ndiaye.  She is agreeable to not pursue further imaging (i.g MRI) given patient's age and goals of care and acknowledgement would not pursue surgical intervention.  Wanting to focus on treating pain and improving comfort.  She is agreeable to schedule dilaudid 0.5mg TID and BID prn and monitor for effectiveness.  Physical therapy was also ordered at visit on Monday       REVIEW OF SYSTEMS:  4 point ROS including Respiratory, CV, GI and , other than that noted in the HPI,  is negative    BP 97/65  Pulse 72  Temp 98  F (36.7  C)  Resp 16  Ht 5' 2\" (1.575 m)  Wt 92 lb (41.7 kg)  SpO2 96%  BMI 16.83 kg/m2  GENERAL APPEARANCE:  Alert, resting in bed, does not appear to be in distress  RESP:  respiratory effort and palpation of chest normal, " lungs clear to auscultation---diminished throughout, no respiratory distress, no cough, on room air, hoarse voice  CV:  Palpation and auscultation of heart done , no edema, +2 pedal pulses, rate-normal, grade 2/6 murmur  M/S:   Gait and station abnormal uses walker for ambulation, kyphosis, no edema/erythema of joints, CMS intact, pain to mid back with any movement   NEURO:   Cranial nerves 2-12 are normal tested and grossly at patient's baseline, speech clear, no facial asymmetry   PSYCH:  oriented to person, place, insight and judgement impaired, memory impaired , affect and mood normal    ASSESSMENT/PLAN:  (M54.6) Acute left-sided thoracic back pain  (primary encounter diagnosis)  Comment: Acute, pain not well managed  Plan: schedule dilaudid 0.5mg po TID and BID prn---staff to update if patient still uncomfortable   -continue senna s BID for bowels, staff to update if increasing constipation with narcotic dose increase  -physical therapy eval         Electronically signed by:  JH Hogan CNP

## 2017-12-11 NOTE — PROGRESS NOTES
"[place] [facility]    Chief Complaint   Patient presents with     Nursing Home Acute       HPI:    Marce Segura is a 89 year old  (3/12/1923), who is being seen today for an episodic care visit at [facility].    HPI information obtained from: facility chart records, facility staff and patient report. Today's concern is:    Acute left-sided thoracic back pain  Seen today for continued acute back pain after a fall Friday 12/1.  X-rays completed with no acute findings, multiple old compression fx and patient with history of osteoporosis.  Last Thursday 12/8, after discussion with daughter/healthcare agent Sj, increased pain meds to dilaudid 0.5mg po TID and BID prn.    Staff report patient continues to have a lot of pain, at times not wanting to get out of bed to eat which is unlike her due to pain.  Pain reports pain 10/10 when she moves, is 0/10 with rest.  She would like to be able to get out of bed at times to go to activities and eat in dining room.  Patient reports bowels working ok, however documentation in NH charts poor and stating has not had BM for 8 days.  Denies/no reports of abdominal pain, nausea, emesis     Continues to denies/no report of changes in sensation, changes in bowels.  She has scheduled icy hot, APAP,  Patient's goals of care are focus on comfort.  Goals of care were discussed with daughter on 12/7 (please see encounter)         REVIEW OF SYSTEMS:  4 point ROS including Respiratory, CV, GI and , other than that noted in the HPI,  is negative    /60  Pulse 67  Temp 98.6  F (37  C)  Resp 17  Ht 5' 2\" (1.575 m)  Wt 92 lb 6.4 oz (41.9 kg)  SpO2 93%  BMI 16.9 kg/m2  GENERAL APPEARANCE:  Alert, resting in bed, does not appear to be in distress  M/S:   Gait and station abnormal uses walker for ambulation, kyphosis, no edema/erythema of joints, CMS intact, pain to mid back with any movement   NEURO:   Cranial nerves 2-12 are normal " tested and grossly at patient's baseline, speech clear, no facial asymmetry   PSYCH:  oriented to person, place, insight and judgement impaired, memory impaired , affect and mood normal    ASSESSMENT/PLAN:  (M54.6) Acute left-sided thoracic back pain  (primary encounter diagnosis)  Comment: Continues to not have good pain control, goal of care comfort  Plan: increase dilaudid 1mg po BID, keep prn dose same  -staff update if no improvement in pain  -needs BM TODAY, to use DWIGHT if needed  -increasing senna to 2 tabs po BID, hold if loose stools         Electronically signed by:  JH Hogan CNP

## 2017-12-30 PROBLEM — M81.0 AGE-RELATED OSTEOPOROSIS WITHOUT CURRENT PATHOLOGICAL FRACTURE: Status: ACTIVE | Noted: 2017-01-01

## 2017-12-30 NOTE — PROGRESS NOTES
"Marce Segura is a 89 year old female, goes by Rupal, seen December 18, 2017 at [facility] where she has resided for 2 years (admit 9/2015) seen to follow up atrial fibrillation, chronic diastolic CHF and dementia.   Patient is seen in her room resting abed.   She has difficulty hearing and understanding questions.    By chart review, she had a fall earlier this month, with subsequent severe left-sided low back pain   No acute fracture on imaging, but multiple old compression fx.   She is now on scheduled Dilaudid and more comfortable, using WC or walker with nursing assist.     Patient has diastolic CHF with LVH and mod AS on last ECHO.    She had an episode of afib during a 2015 hospitalization and has been maintained on low dose amiodarone since that time.   Recent exacerbation tx'd bump of furosemide; no dyspnea or CV symptoms reported today.   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       Past Medical History:   Diagnosis Date     Aortic stenosis     7/2014 Mod AS per Echo     Atrial fibrillation (H)      Back pain      Congestive heart failure (H)      Dementia without behavioral disturbance      Hyperkalemia      Hypernatremia      Lumbar compression fracture (H)      Osteoporosis      Subarachnoid hemorrhage (H) 8/10/15    C1 vertebra     SH:  , son Abraham is first contact.   She also has a daughter Rae Ndiaye    ROS:  Limited, as above.     EXAM:  Frail, NAD  /72  Pulse 79  Temp 97.5  F (36.4  C)  Resp 15  Ht 5' 2\" (1.575 m)  Wt 91 lb (41.3 kg)  SpO2 95%  BMI 16.64 kg/m2   Neck supple without adeanopathy  +kyphoscoliosis  Lungs decreased BS, no rales or wheeze  Heart irreg irreg, 2/6 NATHEN  Abd soft, NT, no distention, +BS  Ext without edema.  Mild deforming changes of OA.    Neuro: limited verbal skills, STML  Psych: affect okay.     Last Basic Metabolic Panel:  Lab Results   Component Value Date     12/01/2017    "   Lab Results   Component Value Date    POTASSIUM 3.9 12/05/2017     Lab Results   Component Value Date    CHLORIDE 109 12/01/2017     Lab Results   Component Value Date    MARIANO 9.5 12/01/2017     Lab Results   Component Value Date    CO2 27 12/01/2017     Lab Results   Component Value Date    BUN 25 12/01/2017     Lab Results   Component Value Date    CR 0.69 12/01/2017   GFR >60  Lab Results   Component Value Date     12/01/2017     Lab Results   Component Value Date    WBC 8.4 12/01/2017     Component Value Date    HGB 10.8 12/01/2017     Component Value Date    MCV 94 12/01/2017     Component Value Date     12/01/2017     TSH   Date Value Ref Range Status   08/28/2017 1.85 0.30 - 5.00 uIU/mL Final       IMP/PLAN:  (W19.XXXD) Fall, subsequent encounter    (M54.5) Acute left-sided low back pain without sciatica  (M81.0) Age-related osteoporosis without current pathological fracture  Comment: gradual improvement in level of pain, remains a fall risk as she attempts self-transfer  Plan: scheduled tid acetaminophen and Dilaudid with bowel regimen.  Local measures  Continue calcium and vit D.   Assist with mobility    (I48.2) Chronic atrial fibrillation (H)    Comment: irreg past 2 visits, may no longer be achieving any rhythm control.    Plan: daily ASA  Consider d/c of amiodarone    (I35.0) Nonrheumatic aortic valve stenosis  (I50.32) Chronic diastolic congestive heart failure (H)  Comment: recent exacerbation, no apparent CV symptoms today   Plan: continue to monitor volume status; goal is comfort       (G30.1,  F02.80) Late onset Alzheimer's disease without behavioral disturbance  Comment: with anxious features  Plan: LTC support for assist with mobility, transfers, meds, meals, activity   Continue sertraline, which has helped with her anxiety    (E03.9) Hypothyroidism, unspecified type  Comment: on replacement  Plan: monitor TFTs while on amiodarone    Mayte Arellano MD

## 2018-01-01 ENCOUNTER — TRANSFERRED RECORDS (OUTPATIENT)
Dept: HEALTH INFORMATION MANAGEMENT | Facility: CLINIC | Age: 83
End: 2018-01-01

## 2018-01-01 ENCOUNTER — RECORDS - HEALTHEAST (OUTPATIENT)
Dept: LAB | Facility: CLINIC | Age: 83
End: 2018-01-01

## 2018-01-01 ENCOUNTER — NURSING HOME VISIT (OUTPATIENT)
Dept: GERIATRICS | Facility: CLINIC | Age: 83
End: 2018-01-01
Payer: COMMERCIAL

## 2018-01-01 ENCOUNTER — CLINICAL UPDATE (OUTPATIENT)
Dept: PHARMACY | Facility: CLINIC | Age: 83
End: 2018-01-01

## 2018-01-01 ENCOUNTER — NURSING HOME VISIT (OUTPATIENT)
Dept: GERIATRICS | Facility: CLINIC | Age: 83
End: 2018-01-01
Payer: MEDICARE

## 2018-01-01 ENCOUNTER — TELEPHONE (OUTPATIENT)
Dept: GERIATRICS | Facility: CLINIC | Age: 83
End: 2018-01-01

## 2018-01-01 VITALS
HEIGHT: 62 IN | RESPIRATION RATE: 12 BRPM | BODY MASS INDEX: 14.83 KG/M2 | SYSTOLIC BLOOD PRESSURE: 113 MMHG | WEIGHT: 80.6 LBS | TEMPERATURE: 97.5 F | HEART RATE: 58 BPM | OXYGEN SATURATION: 94 % | DIASTOLIC BLOOD PRESSURE: 63 MMHG

## 2018-01-01 VITALS
RESPIRATION RATE: 14 BRPM | TEMPERATURE: 95 F | BODY MASS INDEX: 16.71 KG/M2 | HEART RATE: 74 BPM | SYSTOLIC BLOOD PRESSURE: 112 MMHG | OXYGEN SATURATION: 95 % | HEIGHT: 62 IN | DIASTOLIC BLOOD PRESSURE: 65 MMHG | WEIGHT: 90.8 LBS

## 2018-01-01 VITALS
TEMPERATURE: 98.5 F | OXYGEN SATURATION: 92 % | SYSTOLIC BLOOD PRESSURE: 132 MMHG | HEART RATE: 69 BPM | DIASTOLIC BLOOD PRESSURE: 60 MMHG | BODY MASS INDEX: 17.26 KG/M2 | WEIGHT: 93.8 LBS | RESPIRATION RATE: 16 BRPM | HEIGHT: 62 IN

## 2018-01-01 VITALS
OXYGEN SATURATION: 92 % | BODY MASS INDEX: 15.79 KG/M2 | TEMPERATURE: 97.5 F | HEIGHT: 62 IN | HEART RATE: 69 BPM | DIASTOLIC BLOOD PRESSURE: 66 MMHG | RESPIRATION RATE: 14 BRPM | SYSTOLIC BLOOD PRESSURE: 116 MMHG | WEIGHT: 85.8 LBS

## 2018-01-01 VITALS
DIASTOLIC BLOOD PRESSURE: 62 MMHG | TEMPERATURE: 98.3 F | HEIGHT: 62 IN | HEART RATE: 80 BPM | RESPIRATION RATE: 15 BRPM | BODY MASS INDEX: 16.38 KG/M2 | SYSTOLIC BLOOD PRESSURE: 115 MMHG | WEIGHT: 89 LBS | OXYGEN SATURATION: 92 %

## 2018-01-01 VITALS
DIASTOLIC BLOOD PRESSURE: 71 MMHG | SYSTOLIC BLOOD PRESSURE: 124 MMHG | OXYGEN SATURATION: 96 % | HEART RATE: 90 BPM | HEIGHT: 62 IN | RESPIRATION RATE: 19 BRPM | BODY MASS INDEX: 16.16 KG/M2 | WEIGHT: 87.8 LBS | TEMPERATURE: 97.7 F

## 2018-01-01 VITALS
TEMPERATURE: 99.2 F | OXYGEN SATURATION: 90 % | DIASTOLIC BLOOD PRESSURE: 50 MMHG | SYSTOLIC BLOOD PRESSURE: 102 MMHG | HEART RATE: 82 BPM | RESPIRATION RATE: 16 BRPM | BODY MASS INDEX: 16.49 KG/M2 | WEIGHT: 89.6 LBS | HEIGHT: 62 IN

## 2018-01-01 VITALS
TEMPERATURE: 98.5 F | DIASTOLIC BLOOD PRESSURE: 77 MMHG | HEIGHT: 62 IN | HEART RATE: 64 BPM | WEIGHT: 93.8 LBS | RESPIRATION RATE: 16 BRPM | OXYGEN SATURATION: 92 % | BODY MASS INDEX: 17.26 KG/M2 | SYSTOLIC BLOOD PRESSURE: 132 MMHG

## 2018-01-01 VITALS
OXYGEN SATURATION: 90 % | SYSTOLIC BLOOD PRESSURE: 93 MMHG | RESPIRATION RATE: 20 BRPM | TEMPERATURE: 97.8 F | HEART RATE: 88 BPM | HEIGHT: 62 IN | WEIGHT: 84.6 LBS | BODY MASS INDEX: 15.57 KG/M2 | DIASTOLIC BLOOD PRESSURE: 65 MMHG

## 2018-01-01 VITALS
HEART RATE: 93 BPM | DIASTOLIC BLOOD PRESSURE: 62 MMHG | TEMPERATURE: 97 F | OXYGEN SATURATION: 97 % | WEIGHT: 90 LBS | RESPIRATION RATE: 21 BRPM | HEIGHT: 62 IN | BODY MASS INDEX: 16.56 KG/M2 | SYSTOLIC BLOOD PRESSURE: 95 MMHG

## 2018-01-01 VITALS
SYSTOLIC BLOOD PRESSURE: 111 MMHG | RESPIRATION RATE: 20 BRPM | BODY MASS INDEX: 15.57 KG/M2 | TEMPERATURE: 97.8 F | WEIGHT: 84.6 LBS | HEART RATE: 89 BPM | HEIGHT: 62 IN | OXYGEN SATURATION: 94 % | DIASTOLIC BLOOD PRESSURE: 69 MMHG

## 2018-01-01 VITALS
TEMPERATURE: 97.8 F | WEIGHT: 84.6 LBS | BODY MASS INDEX: 15.57 KG/M2 | HEIGHT: 62 IN | HEART RATE: 89 BPM | OXYGEN SATURATION: 94 % | DIASTOLIC BLOOD PRESSURE: 69 MMHG | RESPIRATION RATE: 20 BRPM | SYSTOLIC BLOOD PRESSURE: 111 MMHG

## 2018-01-01 DIAGNOSIS — F02.80 LATE ONSET ALZHEIMER'S DISEASE WITHOUT BEHAVIORAL DISTURBANCE (H): ICD-10-CM

## 2018-01-01 DIAGNOSIS — M81.0 OSTEOPOROSIS WITHOUT CURRENT PATHOLOGICAL FRACTURE, UNSPECIFIED OSTEOPOROSIS TYPE: ICD-10-CM

## 2018-01-01 DIAGNOSIS — I48.20 CHRONIC ATRIAL FIBRILLATION (H): ICD-10-CM

## 2018-01-01 DIAGNOSIS — N95.2 ATROPHIC VAGINITIS: ICD-10-CM

## 2018-01-01 DIAGNOSIS — F43.23 ADJUSTMENT DISORDER WITH MIXED ANXIETY AND DEPRESSED MOOD: ICD-10-CM

## 2018-01-01 DIAGNOSIS — Z51.5 HOSPICE CARE PATIENT: ICD-10-CM

## 2018-01-01 DIAGNOSIS — N93.9 VAGINAL BLEEDING: Primary | ICD-10-CM

## 2018-01-01 DIAGNOSIS — E03.2 HYPOTHYROIDISM DUE TO MEDICATION: ICD-10-CM

## 2018-01-01 DIAGNOSIS — F02.80 LATE ONSET ALZHEIMER'S DISEASE WITHOUT BEHAVIORAL DISTURBANCE (H): Primary | ICD-10-CM

## 2018-01-01 DIAGNOSIS — N93.9 VAGINAL BLEEDING: ICD-10-CM

## 2018-01-01 DIAGNOSIS — I35.0 NONRHEUMATIC AORTIC VALVE STENOSIS: ICD-10-CM

## 2018-01-01 DIAGNOSIS — I48.20 CHRONIC ATRIAL FIBRILLATION (H): Primary | ICD-10-CM

## 2018-01-01 DIAGNOSIS — I10 ESSENTIAL HYPERTENSION: ICD-10-CM

## 2018-01-01 DIAGNOSIS — N76.0 BACTERIAL VAGINITIS: ICD-10-CM

## 2018-01-01 DIAGNOSIS — M15.9 OSTEOARTHRITIS OF MULTIPLE JOINTS, UNSPECIFIED OSTEOARTHRITIS TYPE: ICD-10-CM

## 2018-01-01 DIAGNOSIS — I50.32 CHRONIC DIASTOLIC CONGESTIVE HEART FAILURE (H): ICD-10-CM
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
## 2018-01-01 DIAGNOSIS — G30.1 LATE ONSET ALZHEIMER'S DISEASE WITHOUT BEHAVIORAL DISTURBANCE (H): ICD-10-CM

## 2018-01-01 DIAGNOSIS — E46 PROTEIN-CALORIE MALNUTRITION, UNSPECIFIED SEVERITY (H): ICD-10-CM

## 2018-01-01 DIAGNOSIS — E03.9 HYPOTHYROIDISM, UNSPECIFIED TYPE: Primary | ICD-10-CM

## 2018-01-01 DIAGNOSIS — M15.0 PRIMARY OSTEOARTHRITIS INVOLVING MULTIPLE JOINTS: ICD-10-CM

## 2018-01-01 DIAGNOSIS — G30.1 LATE ONSET ALZHEIMER'S DISEASE WITHOUT BEHAVIORAL DISTURBANCE (H): Primary | ICD-10-CM

## 2018-01-01 DIAGNOSIS — E03.9 HYPOTHYROIDISM, UNSPECIFIED TYPE: ICD-10-CM

## 2018-01-01 DIAGNOSIS — B96.89 BACTERIAL VAGINITIS: ICD-10-CM

## 2018-01-01 DIAGNOSIS — K64.4 EXTERNAL HEMORRHOIDS: Primary | ICD-10-CM

## 2018-01-01 DIAGNOSIS — R63.4 LOSS OF WEIGHT: ICD-10-CM

## 2018-01-01 DIAGNOSIS — F41.9 ANXIETY: ICD-10-CM

## 2018-01-01 DIAGNOSIS — Z71.89 ADVANCED DIRECTIVES, COUNSELING/DISCUSSION: ICD-10-CM

## 2018-01-01 DIAGNOSIS — R63.4 LOSS OF WEIGHT: Primary | ICD-10-CM

## 2018-01-01 DIAGNOSIS — I35.0 AORTIC VALVE STENOSIS, UNSPECIFIED ETIOLOGY: ICD-10-CM

## 2018-01-01 LAB
ANION GAP SERPL CALCULATED.3IONS-SCNC: 11 MMOL/L (ref 5–18)
ANION GAP SERPL CALCULATED.3IONS-SCNC: 11 MMOL/L (ref 5–18)
ANION GAP SERPL CALCULATED.3IONS-SCNC: 6 MMOL/L (ref 5–18)
ANION GAP SERPL CALCULATED.3IONS-SCNC: 6 MMOL/L (ref 5–18)
ANION GAP SERPL CALCULATED.3IONS-SCNC: 8 MMOL/L (ref 5–18)
ANION GAP SERPL CALCULATED.3IONS-SCNC: 8 MMOL/L (ref 5–18)
ANION GAP SERPL CALCULATED.3IONS-SCNC: 9 MMOL/L (ref 5–18)
ANION GAP SERPL CALCULATED.3IONS-SCNC: 9 MMOL/L (ref 5–18)
BUN SERPL-MCNC: 18 MG/DL (ref 8–28)
BUN SERPL-MCNC: 19 MG/DL (ref 8–28)
BUN SERPL-MCNC: 19 MG/DL (ref 8–28)
CALCIUM SERPL-MCNC: 8.9 MG/DL (ref 8.5–10.5)
CALCIUM SERPL-MCNC: 8.9 MG/DL (ref 8.5–10.5)
CALCIUM SERPL-MCNC: 9.2 MG/DL (ref 8.5–10.5)
CALCIUM SERPL-MCNC: 9.2 MG/DL (ref 8.5–10.5)
CALCIUM SERPL-MCNC: 9.4 MG/DL (ref 8.5–10.5)
CALCIUM SERPL-MCNC: 9.4 MG/DL (ref 8.5–10.5)
CALCIUM SERPL-MCNC: 9.5 MG/DL (ref 8.5–10.5)
CALCIUM SERPL-MCNC: 9.5 MG/DL (ref 8.5–10.5)
CHLORIDE BLD-SCNC: 104 MMOL/L (ref 98–107)
CHLORIDE BLD-SCNC: 104 MMOL/L (ref 98–107)
CHLORIDE BLD-SCNC: 106 MMOL/L (ref 98–107)
CHLORIDE BLD-SCNC: 109 MMOL/L (ref 98–107)
CHLORIDE SERPLBLD-SCNC: 104 MMOL/L (ref 98–107)
CHLORIDE SERPLBLD-SCNC: 104 MMOL/L (ref 98–107)
CHLORIDE SERPLBLD-SCNC: 106 MMOL/L (ref 98–107)
CHLORIDE SERPLBLD-SCNC: 109 MMOL/L (ref 98–107)
CO2 SERPL-SCNC: 26 MMOL/L (ref 22–31)
CO2 SERPL-SCNC: 26 MMOL/L (ref 22–31)
CO2 SERPL-SCNC: 27 MMOL/L (ref 22–31)
CO2 SERPL-SCNC: 28 MMOL/L (ref 22–31)
CO2 SERPL-SCNC: 28 MMOL/L (ref 22–31)
CREAT SERPL-MCNC: 0.55 MG/DL (ref 0.6–1.1)
CREAT SERPL-MCNC: 0.55 MG/DL (ref 0.6–1.1)
CREAT SERPL-MCNC: 0.56 MG/DL (ref 0.6–1.1)
CREAT SERPL-MCNC: 0.56 MG/DL (ref 0.6–1.1)
CREAT SERPL-MCNC: 0.59 MG/DL (ref 0.6–1.1)
CREAT SERPL-MCNC: 0.59 MG/DL (ref 0.6–1.1)
CREAT SERPL-MCNC: 0.6 MG/DL (ref 0.6–1.1)
CREAT SERPL-MCNC: 0.6 MG/DL (ref 0.6–1.1)
GFR SERPL CREATININE-BSD FRML MDRD: >60 ML/MIN/1.73M2
GLUCOSE BLD-MCNC: 73 MG/DL (ref 70–125)
GLUCOSE BLD-MCNC: 74 MG/DL (ref 70–125)
GLUCOSE BLD-MCNC: 75 MG/DL (ref 70–125)
GLUCOSE BLD-MCNC: 76 MG/DL (ref 70–125)
GLUCOSE SERPL-MCNC: 73 MG/DL (ref 70–125)
GLUCOSE SERPL-MCNC: 74 MG/DL (ref 70–125)
GLUCOSE SERPL-MCNC: 75 MG/DL (ref 70–125)
GLUCOSE SERPL-MCNC: 76 MG/DL (ref 70–125)
HEMOGLOBIN: 10 G/DL (ref 12–16)
HEMOGLOBIN: 10.1 G/DL (ref 12–16)
HEMOGLOBIN: 10.3 G/DL (ref 12–16)
HEMOGLOBIN: 10.5 G/DL (ref 12–16)
HEMOGLOBIN: 10.7 G/DL (ref 12–16)
HGB BLD-MCNC: 10 G/DL (ref 12–16)
HGB BLD-MCNC: 10.1 G/DL (ref 12–16)
HGB BLD-MCNC: 10.3 G/DL (ref 12–16)
HGB BLD-MCNC: 10.5 G/DL (ref 12–16)
HGB BLD-MCNC: 10.7 G/DL (ref 12–16)
HGB BLD-MCNC: 9.9 G/DL (ref 12–16)
MAGNESIUM SERPL-MCNC: 1.4 MG/DL (ref 1.8–2.6)
MAGNESIUM SERPL-MCNC: 1.4 MG/DL (ref 1.8–2.6)
MAGNESIUM SERPL-MCNC: 1.5 MG/DL (ref 1.8–2.6)
POTASSIUM BLD-SCNC: 3.3 MMOL/L (ref 3.5–5)
POTASSIUM BLD-SCNC: 3.6 MMOL/L (ref 3.5–5)
POTASSIUM BLD-SCNC: 3.7 MMOL/L (ref 3.5–5)
POTASSIUM BLD-SCNC: 3.8 MMOL/L (ref 3.5–5)
POTASSIUM SERPL-SCNC: 3.3 MMOL/L (ref 3.5–5)
POTASSIUM SERPL-SCNC: 3.6 MMOL/L (ref 3.5–5)
POTASSIUM SERPL-SCNC: 3.7 MMOL/L (ref 3.5–5)
POTASSIUM SERPL-SCNC: 3.8 MMOL/L (ref 3.5–5)
SODIUM SERPL-SCNC: 139 MMOL/L (ref 136–145)
SODIUM SERPL-SCNC: 139 MMOL/L (ref 36–145)
SODIUM SERPL-SCNC: 141 MMOL/L (ref 136–145)
SODIUM SERPL-SCNC: 141 MMOL/L (ref 136–145)
SODIUM SERPL-SCNC: 142 MMOL/L (ref 136–145)
SODIUM SERPL-SCNC: 142 MMOL/L (ref 136–145)
SODIUM SERPL-SCNC: 143 MMOL/L (ref 136–145)
SODIUM SERPL-SCNC: 143 MMOL/L (ref 136–145)
TSH SERPL DL<=0.005 MIU/L-ACNC: 2.57 UIU/ML (ref 0.3–5)
TSH SERPL-ACNC: 2.57 UIU/ML (ref 0.3–5)

## 2018-01-01 PROCEDURE — 99310 SBSQ NF CARE HIGH MDM 45: CPT | Performed by: NURSE PRACTITIONER

## 2018-01-01 PROCEDURE — 99309 SBSQ NF CARE MODERATE MDM 30: CPT | Performed by: INTERNAL MEDICINE

## 2018-01-01 PROCEDURE — 99309 SBSQ NF CARE MODERATE MDM 30: CPT | Performed by: NURSE PRACTITIONER

## 2018-01-01 PROCEDURE — 99318 ZZC ANNUAL NURSING FAC ASSESSMNT, STABLE: CPT | Performed by: NURSE PRACTITIONER

## 2018-01-01 PROCEDURE — 99309 SBSQ NF CARE MODERATE MDM 30: CPT | Mod: GW | Performed by: NURSE PRACTITIONER

## 2018-01-18 NOTE — PROGRESS NOTES
"This patient's medication list and chart were reviewed as part of the service provided by [facility].    Assessment/Recommendations:  1. (Afib):  Per MD's most recent note, \"irreg past 2 visits, may no longer be achieving any rhythm control. Consider d'c of Amiodarone.\"  Agree with above recommendation to consider d'c if remains irregular.  Duration of amiodarone effect following d'c may be up to several months.      Cecelia Osborn, Pharm.D.,[facility]  Board Certified Geriatric Pharmacist  Medication Therapy Management Pharmacist  [phone]          "

## 2018-01-25 NOTE — PROGRESS NOTES
[place] [facility]    Chief Complaint   Patient presents with     Nursing Home Acute       HPI:    Marce Segura is a 89 year old  (3/12/1923), who is being seen today for an episodic care visit at [facility].  HPI information obtained from: facility chart records, facility staff, patient report and family/first contact daughter, Rae Ndiaye report.Today's concern is:    Chronic atrial fibrillation (H)  Aortic Stenosis  Noted when hospitalized August 2015, very symptomatic, RVR.  Started on low dose amiodarone.  Cardiologist Dr. Holloway, recommended to continue ammiodarone given patient's underlying aortic stenosis, as she may become very symptomatic.  However, patient has not been seen by cardiology since December 2015.  Both pharmacist and collaborating physician, Dr. Arellano, recommending to discontinue medication, as patient has been in irregular rhythm last several visits, unclear if medication providing benefit, and s/e profile high for this medication.    Called and spoke with daughter, Rae Ndiaye (healthcare agent) over the phone and discussed with her.  She is agreeable to d'c and monitor.   Due to age and goals of care patient is anticoagulated with baby aspirin. Her rate has been controlled HR 62-88, she denies shortness of breath, palpitations, chest pain, dizziness or lightheadedness.  Labs closely monitored while on amiodarone     Late onset Alzheimer's disease without behavioral disturbance  Primary reason for LTC, with recent gradual progressive decline-----increasing lethargy, increase in forgetfulness/confusion, not wanting to participate in activities at NH, gradual and slow weight loss.  No reports of behaviors, as below does have anxiety.  Eating at baseline per daughter, appetite fair, gradual weight loss, about 8 lbs weight loss over past year.  Was around 104lbs at admission in 2015, currently around 94lbs    Adjustment disorder with mixed anxiety and  depressed mood  Chronic anxiety, disruptive to patient, family was agreeable to start Zoloft, began December 2015.  Staff and family feel has been helpful for patient, no adverse effects currently noted.  Goal of care comfort.  Today patient denies anxiety, sleeping well.  Still becomes anxious at times and can exacerbate her symptoms and increase her shortness of breath, has been stable. Daughter recently visited from out of state and very happy with current control of anxiety.  Facility review of medication requesting to have Zoloft d'c as no current target symptoms seen.  Discussed with family and they decline, feel Zoloft has managed patient's anxiety well, goals of care of comfort, no adverse effects noted.  I am in agreement with this       Last 3 BPs: 132/60, 138/82, 118/72  Current Weight: 93.8 lbs    ALLERGIES: No known allergies  Past Medical, Surgical, Family and Social History reviewed and updated in [employer].    Current Outpatient Prescriptions   Medication Sig Dispense Refill     Menthol-Methyl Salicylate (ICY HOT EXTRA STRENGTH) 10-30 % CREA Externally apply topically 3 times daily       Levothyroxine Sodium (SYNTHROID PO) Take 88 mcg by mouth       HYDROmorphone (DILAUDID) 2 MG tablet Take 1 mg by mouth 3 times daily        HYDROmorphone (DILAUDID) 2 MG tablet Take 0.5 mg by mouth 2 times daily as needed for moderate to severe pain        conjugated estrogens (PREMARIN) vaginal cream Place 0.5 g vaginally twice a week On Monday and Thursday       witch hazel-glycerin (TUCKS) pad Apply 1 each topically as needed for hemorrhoids       acetaminophen (TYLENOL) 500 MG tablet Take 1,000 mg by mouth as needed for mild pain       acetaminophen (TYLENOL) 500 MG tablet Take 1,000 mg by mouth 3 times daily       pramox-pe-glycerin-petrolatum (PREPARATION H) 1-0.25-14.4-15 % CREA Place rectally 4 times daily as needed for hemorrhoids       sertraline (ZOLOFT) 25 MG tablet Take 25 mg by mouth daily       amiodarone  "(PACERONE/CODARONE) 200 MG tablet Take 0.5 tablets (100 mg) by mouth daily 60 tablet 4     aspirin 81 MG chewable tablet Take 81 mg by mouth daily       ipratropium - albuterol 0.5 mg/2.5 mg/3 mL (DUONEB) 0.5-2.5 (3) MG/3ML nebulization Take 1 vial by nebulization every 6 hours as needed for shortness of breath / dyspnea or wheezing AND give TID until 8/1/2016 then go back to q 6 hrs PRN       senna-docusate (SENOKOT-S;PERICOLACE) 8.6-50 MG per tablet Take 2 tablets by mouth 2 times daily        bisacodyl (DULCOLAX) 10 MG suppository Place 10 mg rectally daily as needed for constipation       Calcium Carbonate-Vitamin D (CALCIUM + D) 600-200 MG-UNIT per tablet Take 1 tablet by mouth 2 times daily.       Multiple Vitamin (MULTIVITAMIN) per tablet Take 1 tablet by mouth daily.       Medications reviewed:  Medications reconciled to facility chart and changes were made to reflect current medications as identified as above med list. Below are the changes that were made:   Medications stopped since last EPIC medication reconciliation:   There are no discontinued medications.    Medications started since last [facility] medication reconciliation:  No orders of the defined types were placed in this encounter.        REVIEW OF SYSTEMS:  4 point ROS including Respiratory, CV, GI and , other than that noted in the HPI,  is negative    Physical Exam:  /60  Pulse 69  Temp 98.5  F (36.9  C)  Resp 16  Ht 5' 2\" (1.575 m)  Wt 93 lb 12.8 oz (42.5 kg)  SpO2 92%  BMI 17.16 kg/m2  GENERAL APPEARANCE:  Alert, resting in bed  NECK:  No adenopathy,masses or thyromegaly  RESP:  respiratory effort and palpation of chest normal, lungs clear to auscultation---diminished throughout, no respiratory distress  CV:  Palpation and auscultation of heart done , no edema, +2 pedal pulses, irregular but rate-normal, grade 2/6 murmur  ABDOMEN:  normal bowel sounds, soft, nontender, no hepatosplenomegaly or other masses  NEURO:   Cranial nerves " 2-12 are normal tested and grossly at patient's baseline, speech clear, no facial asymmetry   PSYCH:  oriented to person, place, insight and judgement impaired, memory impaired , affect and mood slightly anxious but calms during encounter    Recent Labs:     CBC RESULTS:   Recent Labs   Lab Test 12/01/17 11/05/15   WBC  8.4  7.0   RBC  3.55*  3.71*   HGB  10.8*  11.0*   HCT  33.2*  33.7*   MCV  94  91   MCH  30.4  29.6   MCHC  32.5  32.6   RDW  15.7*  15.6*   PLT  160  206       Last Basic Metabolic Panel:  Recent Labs   Lab Test 12/05/17 12/01/17 08/28/17   NA   --   143  141   POTASSIUM  3.9  3.3*  3.9   CHLORIDE   --   109*  105   MARIANO   --   9.5  8.8   CO2   --   27  28   BUN   --   25  19   CR   --   0.69  0.63   GLC   --   105  74       Assessment/Plan:  (I48.2) Chronic atrial fibrillation (H)  (primary encounter diagnosis)  Comment: Chronic, remains in irregular rhythm but rate control  Plan: per pharmacist and Dr. Arellano's recommendation, d'c amiodarone and monitor for worsening shortness of breath, palpitations, dyspnea, ect  -given age and goals of care, anticoagulate with baby asa     (G30.1,  F02.80) Late onset Alzheimer's disease without behavioral disturbance  Comment: Chronic and progressive, expect continual decline in function  Plan: appropriate for LTC---staff assist with cares  -Goal of care comfort     (F43.23) Adjustment disorder with mixed anxiety and depressed mood  Comment: Chronic, stable, can exacerbate her symptoms and shortness of breath  Plan: disagree with facilities recommendation to d'c Zoloft.  Continue Zoloft, has been beneficial and benefits outweigh risks at this time.  Goal of care comfort.  Daughter is in agreement and not interested in taper or d'c        (I35.0) Nonrheumatic aortic valve stenosis  Comment: chronic, stable  Plan: as above, trial d'c amiodarone, discussed with daughter and she is agreeable         Total time spent with patient visit at the skilled nursing facility  was 25 min including patient visit, review of past records and phone call to patient contact. Greater than 50% of total time spent with counseling and coordinating care     Electronically signed by  JH Hogan CNP

## 2018-02-01 NOTE — PROGRESS NOTES
[facility]    Chief Complaint   Patient presents with     FDC Regulatory       HPI:    Marce Segura is a 89 year old  (3/12/1923), who is being seen today for a federally mandated E/M visit at [facility].  HPI information obtained from: facility chart records, facility staff, patient report and [place] Epic chart review. Today's concerns are:    Chronic atrial fibrillation (H)  Nonrheumatic aortic valve stenosis  Noted when hospitalized August 2015, very symptomatic, RVR.  Started on low dose amiodarone.  Cardiologist Dr. Holloway, recommended to continue ammiodarone given patient's underlying aortic stenosis, as she may become very symptomatic.  However, patient has not been seen by cardiology since December 2015.  Both pharmacist and collaborating physician, Dr. Arellano, recommending to discontinue medication, as patient has been in irregular rhythm last several visits, unclear if medication providing benefit, and s/e profile high for this medication.     Spoke with daughter, Rae Ndiaye (healthcare agent) over the phone and discussed with her last week and she was agreeable to d'c and monitor.  Due to age and goals of care patient is anticoagulated with baby aspirin. Her rate has been controlled since d'c HR 70-88, she denies shortness of breath, palpitations, chest pain, dizziness or lightheadedness.  Labs closely monitored while on amiodarone     Chronic diastolic congestive heart failure (H)  Per history, goal of care comfort.       Today patient denies shortness of breath, appears comfortable during encounter and talking throughout visit.  Nursing staff without concerns.  No reports of chest pain, orthopnea, PND.  Patient with chronic anxiety that per staff/family generally plays a role in her symptoms.  Last echo from 2014 with EF of 60%-65%.  No longer following with cards due to goals of care, advancing dementia with difficulty getting to  apts.  No recent weight gain.     Late onset Alzheimer's disease without behavioral disturbance  Primary reason for LTC, with recent gradual progressive decline-----increasing lethargy, increase in forgetfulness/confusion, not wanting to participate in activities at NH, gradual and slow weight loss.  No reports of behaviors, as below does have anxiety.  Eating at baseline per daughter, appetite fair, gradual weight loss, about 8 lbs weight loss over past year.  Was around 104lbs at admission in 2015, currently around 94lbs    Adjustment disorder with mixed anxiety and depressed mood  Chronic anxiety, disruptive to patient, family was agreeable to start Zoloft, began December 2015.  Staff and family feel has been helpful for patient, no adverse effects currently noted.  Goal of care comfort.  Today patient denies anxiety, sleeping well.  Still becomes anxious at times and can exacerbate her symptoms and increase her shortness of breath, has been stable. Daughter recently visited from out of state and very happy with current control of anxiety. Currently on low dose Zoloft, discussed with family last week, they are not interested in GDR, feel Zoloft has managed patient's anxiety well, goals of care of comfort, no adverse effects noted.  I am in agreement with this         Osteoarthritis of multiple joints, unspecified osteoarthritis type  On calcium and vitamin D supplementation.  Still ambulates with 4ww.  OA to multiple joints, most bothersome include her right wrist and thoracic back (after a fall this winter).  Goal of care comfort, family agreeable to scheduled and prn dilaudid which have been beneficial in managing patient's pain level.     Hypothyroidism due to medication  Per history, on synthroid  Lab Results   Component Value Date    TSH 1.85 08/28/2017         Essential hypertension  Per history, improved with discontinuation of amiodarone last week, previously had some hypotension.  Denies/no reports of  chest pain, palpitations  Lab Results   Component Value Date    CR 0.69 12/01/2017     Lab Results   Component Value Date    POTASSIUM 3.9 12/05/2017         Last 3 BPs: 132/77, 132/60, 138/82         Current Weight: 93.8 lbs    ALLERGIES: No known allergies  PAST MEDICAL HISTORY:  has a past medical history of Aortic stenosis; Atrial fibrillation (H); Back pain; Congestive heart failure (H); Dementia without behavioral disturbance; Hyperkalemia; Hypernatremia; Lumbar compression fracture (H); Osteoporosis; and Subarachnoid hemorrhage (H) (8/10/15).  PAST SURGICAL HISTORY:  has a past surgical history that includes neck injury (years ago); Sigmoidoscopy flexible (12/19/2011); and lower back surgery.  FAMILY HISTORY: family history includes CANCER in her sister and sister; DIABETES in her father and mother; Hypertension in her brother.  SOCIAL HISTORY:  reports that she has never smoked. She has never used smokeless tobacco. She reports that she does not drink alcohol or use illicit drugs.    MEDICATIONS:  Current Outpatient Prescriptions   Medication Sig Dispense Refill     Menthol-Methyl Salicylate (ICY HOT EXTRA STRENGTH) 10-30 % CREA Externally apply topically 3 times daily       Levothyroxine Sodium (SYNTHROID PO) Take 88 mcg by mouth       HYDROmorphone (DILAUDID) 2 MG tablet Take 1 mg by mouth 3 times daily        HYDROmorphone (DILAUDID) 2 MG tablet Take 0.5 mg by mouth 2 times daily as needed for moderate to severe pain        conjugated estrogens (PREMARIN) vaginal cream Place 0.5 g vaginally twice a week On Monday and Thursday       witch hazel-glycerin (TUCKS) pad Apply 1 each topically as needed for hemorrhoids       acetaminophen (TYLENOL) 500 MG tablet Take 1,000 mg by mouth as needed for mild pain       acetaminophen (TYLENOL) 500 MG tablet Take 1,000 mg by mouth 3 times daily       pramox-pe-glycerin-petrolatum (PREPARATION H) 1-0.25-14.4-15 % CREA Place rectally 4 times daily as needed for  "hemorrhoids       sertraline (ZOLOFT) 25 MG tablet Take 25 mg by mouth daily       aspirin 81 MG chewable tablet Take 81 mg by mouth daily       ipratropium - albuterol 0.5 mg/2.5 mg/3 mL (DUONEB) 0.5-2.5 (3) MG/3ML nebulization Take 1 vial by nebulization every 6 hours as needed for shortness of breath / dyspnea or wheezing AND give TID until 8/1/2016 then go back to q 6 hrs PRN       senna-docusate (SENOKOT-S;PERICOLACE) 8.6-50 MG per tablet Take 2 tablets by mouth 2 times daily        bisacodyl (DULCOLAX) 10 MG suppository Place 10 mg rectally daily as needed for constipation       Calcium Carbonate-Vitamin D (CALCIUM + D) 600-200 MG-UNIT per tablet Take 1 tablet by mouth 2 times daily.       Multiple Vitamin (MULTIVITAMIN) per tablet Take 1 tablet by mouth daily.       Medications reviewed:  Medications reconciled to facility chart and changes were made to reflect current medications as identified as above med list. Below are the changes that were made:   Medications stopped since last EPIC medication reconciliation:   There are no discontinued medications.    Medications started since last [facility] medication reconciliation:  No orders of the defined types were placed in this encounter.        Case Management:  I have reviewed the care plan and MDS and do agree with the plan. Patient's desire to return to the community is not present.  Information reviewed:  Medications, vital signs, orders, and nursing notes.    ROS:  10 point ROS of systems including Constitutional, Eyes, Respiratory, Cardiovascular, Gastroenterology, Genitourinary, Integumentary, Muscularskeletal, Psychiatric were all negative except for pertinent positives noted in my HPI.    Exam:  Vitals: /77  Pulse 64  Temp 98.5  F (36.9  C)  Resp 16  Ht 5' 2\" (1.575 m)  Wt 93 lb 12.8 oz (42.5 kg)  SpO2 92%  BMI 17.16 kg/m2  BMI= Body mass index is 17.16 kg/(m^2).  GENERAL APPEARANCE:  Alert, in no distress  EYES:  EOM, conjunctivae, lids, " pupils and irises normal  NECK:  No adenopathy,masses or thyromegaly  RESP:  respiratory effort and palpation of chest normal, lungs clear to auscultation---diminished throughout, no respiratory distress  CV:  Palpation and auscultation of heart done, irregular, no edema, +2 pedal pulses, rate-normal, grade 2/6 murmur  ABDOMEN:  normal bowel sounds, soft, nontender, no hepatosplenomegaly or other masses  M/S:   Gait and station abnormal uses walker for ambulation, kyphosis, no edema/erythema of joints, CMS intact   Digits and nails normal  NEURO:   Cranial nerves 2-12 are normal tested and grossly at patient's baseline, speech clear, no facial asymmetry   PSYCH:  oriented to person, place, insight and judgement impaired, memory impaired , affect and mood normal, no anxiety observed during encounter     Lab/Diagnostic data:     CBC RESULTS:   Recent Labs   Lab Test 12/01/17 11/05/15   WBC  8.4  7.0   RBC  3.55*  3.71*   HGB  10.8*  11.0*   HCT  33.2*  33.7*   MCV  94  91   MCH  30.4  29.6   MCHC  32.5  32.6   RDW  15.7*  15.6*   PLT  160  206       Last Basic Metabolic Panel:  Recent Labs   Lab Test 12/05/17 12/01/17 08/28/17   NA   --   143  141   POTASSIUM  3.9  3.3*  3.9   CHLORIDE   --   109*  105   MARIANO   --   9.5  8.8   CO2   --   27  28   BUN   --   25  19   CR   --   0.69  0.63   GLC   --   105  74       Liver Function Studies -   Recent Labs   Lab Test 08/28/17 05/25/17   PROTTOTAL  6.2  7.1   ALBUMIN  2.9*  3.2*   BILITOTAL  0.3  0.3   ALKPHOS  43*  49   AST  14  16   ALT  6  8       TSH   Date Value Ref Range Status   08/28/2017 1.85 0.30 - 5.00 uIU/mL Final   07/17/2017 2.39 0.30 - 5.00 uIU/mL Final       ASSESSMENT/PLAN  (I48.2) Chronic atrial fibrillation (H)  (primary encounter diagnosis)  Comment: Chronic, no changes since d'c of amiodarone last week   Plan: anticoagulation with baby asa given age and goals of care  -monitor for s/s of RVR and staff to update     (I50.32) Chronic diastolic congestive  heart failure (H)  Comment: Chronic, currently compensated  Plan: -Weekly weights, nursing to monitor and update NP if >5lbs per day or 5lbs per week or symptomatic (shortness of breath, increased edema, crackles)    -BMP Q 3 months to monitor electrolytes     (G30.1,  F02.80) Late onset Alzheimer's disease without behavioral disturbance  Comment: Chronic and progressive, expect continual decline in function  Plan: appropriate for LTC---staff assist with cares  -Goal of care comfort     (F43.23) Adjustment disorder with mixed anxiety and depressed mood  Comment: Chronic, stable, can exacerbate her symptoms and shortness of breath  Plan:   Continue Zoloft, has been beneficial and benefits outweigh risks at this time.  Goal of care comfort.  Daughter is in agreement and not interested in taper or d'c     (I35.0) Nonrheumatic aortic valve stenosis  Comment: chronic, asymptomatic during exam today   Plan: monitor volume status as above, goal of care comfort     (M15.9) Osteoarthritis of multiple joints, unspecified osteoarthritis type  Comment: chronic with good pain control at present  Plan: continue calcium and vitamin D  -scheduled APAp, dilaudid and bowel regimen, family in agreement and has improved QOL by managing pain and adding to comfort      (E03.2) Hypothyroidism due to medication  Comment: chronic, given age, goal 3-4, TSH too low  Plan: reduce synthroid 75mcg  -TSH recheck in 6 weeks     (I10) Essential hypertension  Comment: chronic, improved (less hypotension) with d'c amiodarone  Plan: monitor BP, BMP Q 3 months             Electronically signed by:  JH Hogan CNP

## 2018-04-16 NOTE — PROGRESS NOTES
"Marce Segura is a 89 year old female seen April 16, 2018 at [facility] where she has resided for 2 and a half years (admit 9/2015) seen to follow up atrial fibrillation, chronic diastolic CHF and dementia.     Patient is seen in her room resting abed.   She has difficulty hearing and understanding questions.    States she is fine, appears comfortable.    No new concerns reported by nursing staff.      Patient has diastolic CHF with LVH and mod AS on last ECHO.    She had an episode of afib during a 2015 hospitalization.   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       Past Medical History:   Diagnosis Date     Aortic stenosis     7/2014 Mod AS per Echo     Atrial fibrillation (H)      Back pain      Congestive heart failure (H)      Dementia without behavioral disturbance      Hyperkalemia      Hypernatremia      Lumbar compression fracture (H)      Osteoporosis      Subarachnoid hemorrhage (H) 8/10/15    C1 vertebra     SH:  , son Abraham is first contact.   She also has a daughter Rae Ndiaye    ROS:  Limited, as above.   Wt Readings from Last 5 Encounters:   04/16/18 90 lb (40.8 kg)   02/01/18 93 lb 12.8 oz (42.5 kg)   01/25/18 93 lb 12.8 oz (42.5 kg)   12/18/17 91 lb (41.3 kg)   12/11/17 92 lb 6.4 oz (41.9 kg)        EXAM:  Frail, NAD  BP 95/62  Pulse 93  Temp 97  F (36.1  C)  Resp 21  Ht 5' 2\" (1.575 m)  Wt 90 lb (40.8 kg)  SpO2 97%  BMI 16.46 kg/m2   Neck supple without adeanopathy  +kyphoscoliosis  Lungs decreased BS, no rales or wheeze  Heart irreg irreg, s1s2@80, 2/6 NATHEN  Abd soft, NT, no distention, +BS  Ext without edema.  Mild deforming changes of OA.    Neuro: limited verbal skills, STML  Psych: affect okay.     TSH   Date Value Ref Range Status   03/15/2018 2.57 0.30 - 5.00 uIU/mL Final     Last Basic Metabolic Panel:  Lab Results   Component Value Date     02/27/2018      Lab Results   Component Value Date    " POTASSIUM 3.6 02/27/2018     Lab Results   Component Value Date    CHLORIDE 106 02/27/2018     Lab Results   Component Value Date    MARIANO 8.9 02/27/2018     Lab Results   Component Value Date    CO2 27 02/27/2018     Lab Results   Component Value Date    BUN 18 02/27/2018     Lab Results   Component Value Date    CR 0.55 02/27/2018    GFR >60  Lab Results   Component Value Date    GLC 74 02/27/2018       IMP/PLAN:  (I48.2) Chronic atrial fibrillation (H)    Comment: controlled VR without rate slowing medications; stable off amiodarone  Plan: daily ASA for stroke prophylaxis.      (I35.0) Nonrheumatic aortic valve stenosis  (I50.32) Chronic diastolic congestive heart failure (H)  Comment:  no apparent CV symptoms today   Plan: continue to monitor volume status; goal is comfort       (G30.1,  F02.80) Late onset Alzheimer's disease without behavioral disturbance  Comment: with anxious features, ongoing gradual decline  Plan: LTC support for assist with mobility, transfers, meds, meals, activity   Continue sertraline, which has helped with her anxiety    (E03.9) Hypothyroidism, unspecified type  Comment: on replacement  TSH   Date Value Ref Range Status   03/15/2018 2.57 0.30 - 5.00 uIU/mL Final   Plan: yearly TSH    (M15.0) Primary osteoarthritis involving multiple joints  Comment: intermittent and chronic pain, remains ambulatory  Plan: vit D, calcium, scheduled acetaminophen and hydromorphone for comfort.        Mayte Arellano MD

## 2018-05-10 NOTE — PROGRESS NOTES
"[facility]    Chief Complaint   Patient presents with     skilled nursing Acute       [facility] Medical Record Number:  [id number]    HPI:    Marce Segura is a 89 year old  (3/12/1923), who is being seen today for an episodic care visit at [facility].  HPI information obtained from: facility chart records, facility staff and patient report.Today's concern is:    Chronic atrial fibrillation (H)  Noted when hospitalized August 2015, very symptomatic, RVR.  Started on low dose amiodarone.  Cardiologist Dr. Holloway, recommended to continue ammiodarone given patient's underlying aortic stenosis, as she may become very symptomatic.  However, patient has not been seen by cardiology since December 2015.  Both pharmacist and collaborating physician, Dr. Arellano, recommending to discontinue medication, as patient has been in irregular rhythm last several visits, unclear if medication providing benefit, and s/e profile high for this medication.  Family was in agreement and medication discontinued in February, no changes in symptoms reported since d'c      Due to age and goals of care patient is anticoagulated with baby aspirin. Her rate has been controlled since d'c HR 70-87, she denies shortness of breath, palpitations, chest pain, dizziness or lightheadedness.      External hemorrhoids  Seen today per staff request due to small amount of bright red blood noted yesterday by aid after patient had BM.  Patient with history of hemorrhoids.  She tells me today \"my rectum is hurting.\"  Upon exam noted to have a few inflamed hemorrhoids to her rectal area, no blood seen during exam.  Per NH records, BM typically every other day, patient denies/no reports of constipation. Patient denies shortness of breath, dizziness or lightheadedness.  Focus of care comfort      Lab Results   Component Value Date    HGB 10.8 12/01/2017            Late onset Alzheimer's disease without behavioral " disturbance  Primary reason for LTC, with recent gradual progressive decline-----increasing lethargy, increase in forgetfulness/confusion, not wanting to participate in activities at NH, gradual and slow weight loss.  No reports of behaviors, as below does have anxiety.  Eating at baseline per daughter, appetite fair, continues to gradually loose weight.  Was around 104lbs at admission in 2015, currently around 90lbs    Wt Readings from Last 3 Encounters:   05/10/18 90 lb 12.8 oz (41.2 kg)   04/16/18 90 lb (40.8 kg)   02/01/18 93 lb 12.8 oz (42.5 kg)          Last 3 BPs: 112/65, 121/72, 101/63  Current Weight: 90.8 lbs      ALLERGIES: No known allergies  Past Medical, Surgical, Family and Social History reviewed and updated in EPIC.    Current Outpatient Prescriptions   Medication Sig Dispense Refill     acetaminophen (TYLENOL) 500 MG tablet Take 1,000 mg by mouth as needed for mild pain       acetaminophen (TYLENOL) 500 MG tablet Take 1,000 mg by mouth 3 times daily       aspirin 81 MG chewable tablet Take 81 mg by mouth daily       bisacodyl (DULCOLAX) 10 MG suppository Place 10 mg rectally daily as needed for constipation       Calcium Carbonate-Vitamin D (CALCIUM + D) 600-200 MG-UNIT per tablet Take 1 tablet by mouth 2 times daily.       conjugated estrogens (PREMARIN) vaginal cream Place 0.5 g vaginally twice a week On Monday and Thursday       HYDROmorphone (DILAUDID) 2 MG tablet Take 0.5 mg by mouth 2 times daily as needed for moderate to severe pain        HYDROmorphone (DILAUDID) 2 MG tablet Take 1 mg by mouth 3 times daily        ipratropium - albuterol 0.5 mg/2.5 mg/3 mL (DUONEB) 0.5-2.5 (3) MG/3ML nebulization Take 1 vial by nebulization every 6 hours as needed for shortness of breath / dyspnea or wheezing AND give TID until 8/1/2016 then go back to q 6 hrs PRN       Levothyroxine Sodium (SYNTHROID PO) Take 75 mcg by mouth        Menthol-Methyl Salicylate (ICY HOT EXTRA STRENGTH) 10-30 % CREA Externally  "apply topically 3 times daily       Multiple Vitamin (MULTIVITAMIN) per tablet Take 1 tablet by mouth daily.       pramox-pe-glycerin-petrolatum (PREPARATION H) 1-0.25-14.4-15 % CREA Place rectally 4 times daily as needed for hemorrhoids       senna-docusate (SENOKOT-S;PERICOLACE) 8.6-50 MG per tablet Take 2 tablets by mouth 2 times daily        sertraline (ZOLOFT) 25 MG tablet Take 25 mg by mouth daily       witch hazel-glycerin (TUCKS) pad Apply 1 each topically as needed for hemorrhoids       Medications reviewed:  Medications reconciled to facility chart and changes were made to reflect current medications as identified as above med list. Below are the changes that were made:   Medications stopped since last EPIC medication reconciliation:   There are no discontinued medications.    Medications started since last [facility] medication reconciliation:  No orders of the defined types were placed in this encounter.        REVIEW OF SYSTEMS:  4 point ROS including Respiratory, CV, GI and , other than that noted in the HPI,  is negative    Physical Exam:  /65  Pulse 74  Temp 95  F (35  C)  Resp 14  Ht 5' 2\" (1.575 m)  Wt 90 lb 12.8 oz (41.2 kg)  SpO2 95%  BMI 16.61 kg/m2  GENERAL APPEARANCE:  Alert, in no distress  RESP:  respiratory effort and palpation of chest normal, lungs clear to auscultation---diminished throughout, no respiratory distress  CV:  Palpation and auscultation of heart done, irregular, no edema, +2 pedal pulses, rate-normal, grade 2/6 murmur  ABDOMEN:  normal bowel sounds, soft, nontender, no hepatosplenomegaly or other masses  : rectum with several inflamed hemorrhoids noted, no active bleeding seen   PSYCH:  oriented to person, place, insight and judgement impaired, memory impaired , affect and mood normal, no anxiety observed during encounter     Recent Labs:     CBC RESULTS:   Recent Labs   Lab Test 12/01/17 11/05/15   WBC  8.4  7.0   RBC  3.55*  3.71*   HGB  10.8*  11.0*   HCT  " 33.2*  33.7*   MCV  94  91   MCH  30.4  29.6   MCHC  32.5  32.6   RDW  15.7*  15.6*   PLT  160  206       Last Basic Metabolic Panel:  Recent Labs   Lab Test 02/27/18 12/05/17 12/01/17   NA  141   --   143   POTASSIUM  3.6  3.9  3.3*   CHLORIDE  106   --   109*   MARIANO  8.9   --   9.5   CO2  27   --   27   BUN  18   --   25   CR  0.55*   --   0.69   GLC  74   --   105       TSH   Date Value Ref Range Status   03/15/2018 2.57 0.30 - 5.00 uIU/mL Final   08/28/2017 1.85 0.30 - 5.00 uIU/mL Final       Assessment/Plan:  (K64.4) External hemorrhoids  (primary encounter diagnosis)  Comment: chronic and flaring  Plan: prep H tQID X 4 weeks, then change to QID prn  -increase bathes to 3X's weekly for warm soaks to rectum  -stools should be soft, avoid straining, staff to update if concerns  -staff to update if not effective  -continue senna s     (I48.2) Chronic atrial fibrillation (H)  Comment: Chronic, no changes since d'c of amiodarone   Plan: anticoagulation with baby asa given age and goals of care  -monitor for s/s of RVR and staff to update     (G30.1,  F02.80) Late onset Alzheimer's disease without behavioral disturbance  Comment: Chronic and progressive, expect continual decline in function  Plan: appropriate for LTC---staff assist with cares  -Goal of care comfort       Electronically signed by  JH Hogan CNP

## 2018-05-24 NOTE — PROGRESS NOTES
[facility]    Chief Complaint   Patient presents with     longterm Acute       [facility] Medical Record Number:  [id number]    HPI:    Marce Segura is a 89 year old  (3/12/1923), who is being seen today for an episodic care visit at [facility].  HPI information obtained from: facility chart records, facility staff, patient report, [facility] chart review and family/first contact daughter, Rae Ndiaye report.Today's concern is:    Seen today at daughter's request     Chronic atrial fibrillation (H)  Noted when hospitalized August 2015, very symptomatic, RVR.  Started on low dose amiodarone.  Cardiologist Dr. Holloway, recommended to continue ammiodarone given patient's underlying aortic stenosis, as she may become very symptomatic.  However, patient has not been seen by cardiology since December 2015.  Both pharmacist and collaborating physician, Dr. Arellano, recommending to discontinue medication, as patient has been in irregular rhythm last several visits, unclear if medication providing benefit, and s/e profile high for this medication.  Family was in agreement and medication discontinued in February, no changes in symptoms reported since d'c      Due to age and goals of care patient is anticoagulated with baby aspirin. Her rate has been controlled since d'c HR 70-87, she denies shortness of breath, palpitations, chest pain, dizziness or lightheadedness.      Last 3 BPs: 124/71, 88/50, 112/65  Pulse Readings from Last 4 Encounters:   05/24/18 90   05/10/18 74   04/16/18 93   02/01/18 64       Late onset Alzheimer's disease without behavioral disturbance  Anxiety  Primary reason for LTC, with recent gradual progressive decline-----increasing lethargy, increase in forgetfulness/confusion, not wanting to participate in activities at NH, gradual and slow weight loss.  Increase in anxiety features, patient's daughter reports that last week, patient so anxious  for 3-4 nights, she couldn't sleep and was so worked up staff had to call her to calm patient down.  Currently on Zoloft 25mg QD, previously had been working well.  Daughter wants patient to be comfortable and wonders if medication could be increased today.    Eating at baseline per daughter, appetite fair, continues to gradually loose weight.  Was around 104lbs at admission in 2015, currently around 90lbs     Wt Readings from Last 3 Encounters:   05/24/18 87 lb 12.8 oz (39.8 kg)   05/10/18 90 lb 12.8 oz (41.2 kg)   04/16/18 90 lb (40.8 kg)         ALLERGIES: No known allergies  Past Medical, Surgical, Family and Social History reviewed and updated in EPIC.    Current Outpatient Prescriptions   Medication Sig Dispense Refill     acetaminophen (TYLENOL) 500 MG tablet Take 1,000 mg by mouth as needed for mild pain       acetaminophen (TYLENOL) 500 MG tablet Take 1,000 mg by mouth 3 times daily       aspirin 81 MG chewable tablet Take 81 mg by mouth daily       bisacodyl (DULCOLAX) 10 MG suppository Place 10 mg rectally daily as needed for constipation       Calcium Carbonate-Vitamin D (CALCIUM + D) 600-200 MG-UNIT per tablet Take 1 tablet by mouth 2 times daily.       conjugated estrogens (PREMARIN) vaginal cream Place 0.5 g vaginally twice a week On Monday and Thursday       HYDROmorphone (DILAUDID) 2 MG tablet Take 1 mg by mouth 3 times daily        HYDROmorphone (DILAUDID) 2 MG tablet Take 0.5 mg by mouth 2 times daily as needed for moderate to severe pain        ipratropium - albuterol 0.5 mg/2.5 mg/3 mL (DUONEB) 0.5-2.5 (3) MG/3ML nebulization Take 1 vial by nebulization every 6 hours as needed for shortness of breath / dyspnea or wheezing AND give TID until 8/1/2016 then go back to q 6 hrs PRN       Levothyroxine Sodium (SYNTHROID PO) Take 75 mcg by mouth        Menthol-Methyl Salicylate (ICY HOT EXTRA STRENGTH) 10-30 % CREA Externally apply topically 3 times daily       Multiple Vitamin (MULTIVITAMIN) per  "tablet Take 1 tablet by mouth daily.       phenylephrine-shark liver oil-mineral oil-petrolatum (PREPARATION H) 0.25-14-74.9 % rectal ointment Place rectally 4 times daily       pramox-pe-glycerin-petrolatum (PREPARATION H) 1-0.25-14.4-15 % CREA Place rectally 4 times daily as needed for hemorrhoids       senna-docusate (SENOKOT-S;PERICOLACE) 8.6-50 MG per tablet Take 2 tablets by mouth 2 times daily        sertraline (ZOLOFT) 25 MG tablet Take 25 mg by mouth daily       witch hazel-glycerin (TUCKS) pad Apply 1 each topically as needed for hemorrhoids       Medications reviewed:  Medications reconciled to facility chart and changes were made to reflect current medications as identified as above med list. Below are the changes that were made:   Medications stopped since last EPIC medication reconciliation:   There are no discontinued medications.    Medications started since last [facility] medication reconciliation:  No orders of the defined types were placed in this encounter.        REVIEW OF SYSTEMS:  4 point ROS including Respiratory, CV, GI and , other than that noted in the HPI,  is negative    Physical Exam:  /71  Pulse 90  Temp 97.7  F (36.5  C)  Resp 19  Ht 5' 2\" (1.575 m)  Wt 87 lb 12.8 oz (39.8 kg)  SpO2 96%  BMI 16.06 kg/m2  GENERAL APPEARANCE:  Alert, in no distress, resting in bed  RESP:  respiratory effort and palpation of chest normal, lungs clear to auscultation---diminished throughout, no respiratory distress  CV:  Palpation and auscultation of heart done, irregular, no edema, +2 pedal pulses, rate-normal, grade 2/6 murmur  ABDOMEN:  normal bowel sounds, soft, nontender, no hepatosplenomegaly or other masses  Neuro: no facial asymmetry, speech clear  PSYCH:  oriented to person, place, insight and judgement impaired, memory impaired , affect and mood normal, no anxiety observed during encounter     Recent Labs:     CBC RESULTS:   Recent Labs   Lab Test 12/01/17 11/05/15   WBC  8.4  7.0 "   RBC  3.55*  3.71*   HGB  10.8*  11.0*   HCT  33.2*  33.7*   MCV  94  91   MCH  30.4  29.6   MCHC  32.5  32.6   RDW  15.7*  15.6*   PLT  160  206       Last Basic Metabolic Panel:  Recent Labs   Lab Test 02/27/18 12/05/17 12/01/17   NA  141   --   143   POTASSIUM  3.6  3.9  3.3*   CHLORIDE  106   --   109*   MARIANO  8.9   --   9.5   CO2  27   --   27   BUN  18   --   25   CR  0.55*   --   0.69   GLC  74   --   105       TSH   Date Value Ref Range Status   03/15/2018 2.57 0.30 - 5.00 uIU/mL Final   08/28/2017 1.85 0.30 - 5.00 uIU/mL Final     Assessment/Plan:  (I48.2) Chronic atrial fibrillation (H)  (primary encounter diagnosis)  Comment: Chronic, no changes since d'c of amiodarone   Plan: anticoagulation with baby asa given age and goals of care  -monitor for s/s of RVR and staff to update     (G30.1,  F02.80) Late onset Alzheimer's disease without behavioral disturbance  Comment: Chronic and progressive, expect continual decline in function  Plan: appropriate for LTC---staff assist with cares  -Goal of care comfort     (F41.9) Anxiety  Comment: worsening as dementia progresses, daughter requesting increase in Zoloft  Plan: increase Zoloft 50mg po QD  -BMP in 1 week     Called and spoke with patient's daughter, Rae Ndiaye, regarding POC per her request     Electronically signed by  JH Hogan CNP

## 2018-05-31 NOTE — PROGRESS NOTES
[facility]    Chief Complaint   Patient presents with     shelter Acute       [facility] Medical Record Number:  [id number]    HPI:    Marce Segura is a 89 year old  (3/12/1923), who is being seen today for an episodic care visit at [facility].  HPI information obtained from: facility chart records, facility staff, patient report, [facility] chart review and family/first contact daughter, Rae Ndiaye report.Today's concern is:    Compression fracture of vertebra, initial encounter (H)  Osteoporosis without current pathological fracture, unspecified osteoporosis type  Fall, initial encounter  History of osteoporosis, on calcium and vitamin D supplementation.  Still ambulates with 4ww.  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      Fall noted on 5/29/18, trying to ambulate per self to bathroom in middle of the night.  Afterwards with increased lower back pain.  An x-ray of lumber spine completed, see below.      Called and discussed POC with daughter, Rae Ndiaye.  Patient previously on scheduled an prn dilaudid for pain management, has been effective for pain management per staff and patient.  No acute neuro changes noted.  Daughter agreeable to not send patient to neurosurgery, but keep in NH and manage her pain.  She is agreeable to change in pain meds if needed to keep patient comfortable.  We also discussed increasing scheduled toileting with patient in effort to reduce future falls, as majority of falls appear r/t patient trying to ambulate self to bathroom     Impression:  1. Marked generalized osteopenia  2. Compression deformity of L3, unchanged.  Additional compression fractures cannot be excluded on the basis of this limited study  3. Lveoscoliosis is centered about L3      Goal of care comfort, family agreeable to scheduled and prn dilaudid which have been beneficial in managing patient's pain level.         Late onset Alzheimer's disease without behavioral disturbance  Primary reason for LTC, with recent gradual progressive decline-----increasing lethargy, increase in forgetfulness/confusion, not wanting to participate in activities at NH, gradual and slow weight loss.  Increase in anxiety features, Zoloft recently increased last week to help with anxiety.              Last 3 BPs: 102/50, 104/54, 153/77  Recent HR's:  Pulse Readings from Last 4 Encounters:   05/31/18 82   05/24/18 90   05/10/18 74   04/16/18 93   Recent weights:  Wt Readings from Last 5 Encounters:   05/31/18 89 lb 9.6 oz (40.6 kg)   05/24/18 87 lb 12.8 oz (39.8 kg)   05/10/18 90 lb 12.8 oz (41.2 kg)   04/16/18 90 lb (40.8 kg)   02/01/18 93 lb 12.8 oz (42.5 kg)       ALLERGIES: No known allergies  Past Medical, Surgical, Family and Social History reviewed and updated in [facility].    Current Outpatient Prescriptions   Medication Sig Dispense Refill     acetaminophen (TYLENOL) 500 MG tablet Take 1,000 mg by mouth as needed for mild pain       acetaminophen (TYLENOL) 500 MG tablet Take 1,000 mg by mouth 3 times daily       aspirin 81 MG chewable tablet Take 81 mg by mouth daily       bisacodyl (DULCOLAX) 10 MG suppository Place 10 mg rectally daily as needed for constipation       Calcium Carbonate-Vitamin D (CALCIUM + D) 600-200 MG-UNIT per tablet Take 1 tablet by mouth 2 times daily.       conjugated estrogens (PREMARIN) vaginal cream Place 0.5 g vaginally twice a week On Monday and Thursday       HYDROmorphone (DILAUDID) 2 MG tablet Take 1 mg by mouth 3 times daily        HYDROmorphone (DILAUDID) 2 MG tablet Take 0.5 mg by mouth 2 times daily as needed for moderate to severe pain        ipratropium - albuterol 0.5 mg/2.5 mg/3 mL (DUONEB) 0.5-2.5 (3) MG/3ML nebulization Take 1 vial by nebulization every 6 hours as needed for shortness of breath / dyspnea or wheezing AND give TID until 8/1/2016 then go back to q 6 hrs PRN       Levothyroxine Sodium  "(SYNTHROID PO) Take 75 mcg by mouth        Menthol-Methyl Salicylate (ICY HOT EXTRA STRENGTH) 10-30 % CREA Externally apply topically 3 times daily       Multiple Vitamin (MULTIVITAMIN) per tablet Take 1 tablet by mouth daily.       pramox-pe-glycerin-petrolatum (PREPARATION H) 1-0.25-14.4-15 % CREA Place rectally 4 times daily as needed for hemorrhoids       senna-docusate (SENOKOT-S;PERICOLACE) 8.6-50 MG per tablet Take 2 tablets by mouth 2 times daily        sertraline (ZOLOFT) 25 MG tablet Take 50 mg by mouth daily        witch hazel-glycerin (TUCKS) pad Apply 1 each topically as needed for hemorrhoids       Medications reviewed:  Medications reconciled to facility chart and changes were made to reflect current medications as identified as above med list. Below are the changes that were made:   Medications stopped since last EPIC medication reconciliation:   There are no discontinued medications.    Medications started since last [facility] medication reconciliation:  No orders of the defined types were placed in this encounter.        REVIEW OF SYSTEMS:  4 point ROS including Respiratory, CV, GI and , other than that noted in the HPI,  is negative    Physical Exam:  /50  Pulse 82  Temp 99.2  F (37.3  C)  Resp 16  Ht 5' 2\" (1.575 m)  Wt 89 lb 9.6 oz (40.6 kg)  SpO2 90%  BMI 16.39 kg/m2  GENERAL APPEARANCE:  Alert, in no distress  EYES:  EOM, PERRL, conjunctivae, lids, pupils and irises normal  NECK:  No adenopathy,masses or thyromegaly  RESP:  respiratory effort and palpation of chest normal, lungs clear to auscultation---diminished throughout, no respiratory distress  CV:  Palpation and auscultation of heart done, irregular, no edema, +2 pedal pulses, rate-normal, grade 2/6 murmur  ABDOMEN:  normal bowel sounds, soft, nontender, no hepatosplenomegaly or other masses  M/S:   Gait and station abnormal uses walker for ambulation, kyphosis, no edema/erythema of joints, CMS intact   Digits and nails " normal  NEURO:   Cranial nerves 2-12 are normal tested and grossly at patient's baseline, speech clear, no facial asymmetry   PSYCH:  oriented to person, place, insight and judgement impaired, memory impaired , affect and mood normal, no anxiety observed during encounter        Recent Labs:     CBC RESULTS:   Recent Labs   Lab Test 12/01/17 11/05/15   WBC  8.4  7.0   RBC  3.55*  3.71*   HGB  10.8*  11.0*   HCT  33.2*  33.7*   MCV  94  91   MCH  30.4  29.6   MCHC  32.5  32.6   RDW  15.7*  15.6*   PLT  160  206       Last Basic Metabolic Panel:  Recent Labs   Lab Test 05/28/18 02/27/18   NA  139  141   POTASSIUM  3.6  3.6   CHLORIDE  104  106   MARIANO  9.4  8.9   CO2  26  27   BUN  19  18   CR  0.56*  0.55*   GLC  76  74       Liver Function Studies -   Recent Labs   Lab Test 08/28/17 05/25/17   PROTTOTAL  6.2  7.1   ALBUMIN  2.9*  3.2*   BILITOTAL  0.3  0.3   ALKPHOS  43*  49   AST  14  16   ALT  6  8       TSH   Date Value Ref Range Status   03/15/2018 2.57 0.30 - 5.00 uIU/mL Final   08/28/2017 1.85 0.30 - 5.00 uIU/mL Final       Assessment/Plan:  (M48.50XA) Compression fracture of vertebra, initial encounter (H)  (primary encounter diagnosis)  Comment: chronic, cannot exclude acute on recent x-ray.  As noted in HPI, discussed POC with healthcare agent, Rae Ndiaye  Plan: no f/u recommended with neurosurgery, goal to manage pain in NH and avoid future falls  -continue with current scheduled and prn dilaudid, staff to update if pain not well managed  -staff to update with acute neuro changes     (G30.1,  F02.80) Late onset Alzheimer's disease without behavioral disturbance  Comment: Chronic and progressive, expect continual decline in function  Plan: appropriate for LTC---staff assist with cares  -Goal of care comfort     (M81.0) Osteoporosis without current pathological fracture, unspecified osteoporosis type  Comment: places at high risk for fractures  Plan: continue calcium and vitamin D supplementation     (W19.XXXA)  Fall, initial encounter  Comment: noted 5/29  Plan: fall precautions  -staff to toilet 2-3X's per shift to reduce fall risk       Electronically signed by  JH Hogan CNP

## 2018-06-07 NOTE — PROGRESS NOTES
[facility]    Chief Complaint   Patient presents with     half-way Regulatory       [place] Medical Record Number:  [id number]    HPI:    Marce Segura is a 89 year old  (3/12/1923), who is being seen today for a federally mandated E/M visit at [facility].  HPI information obtained from: facility chart records, facility staff, patient report and [place] Epic chart review. Today's concerns are:    Chronic atrial fibrillation (H)  Aortic valve stenosis, unspecified etiology  Noted when hospitalized August 2015, very symptomatic, RVR.  Started on low dose amiodarone.  Cardiologist Dr. Holloway, recommended to continue ammiodarone given patient's underlying aortic stenosis, as she may become very symptomatic.  However, patient has not been seen by cardiology since December 2015.  Both pharmacist and collaborating physician, Dr. Arellano, recommending to discontinue medication, as patient has been in irregular rhythm last several visits, unclear if medication providing benefit, and s/e profile high for this medication.    After discussion with daughter, robert Ndiaye (healthcare agent) agreeable to d'c in February 2018.  No changes in symptoms or rate control since d'c/      Due to age and goals of care patient is anticoagulated with baby aspirin. She denies shortness of breath, palpitations, chest pain, dizziness or lightheadedness.        Pulse Readings from Last 4 Encounters:   06/07/18 88   05/31/18 82   05/24/18 90   05/10/18 74       Compression fracture of vertebra with routine healing, subsequent encounter  Primary osteoarthritis involving multiple joints  Osteoporosis without current pathological fracture, unspecified osteoporosis type  History of osteoporosis, on calcium and vitamin D supplementation.  Still ambulates with 4ww.  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       Fall noted on 5/29/18, trying to ambulate  per self to bathroom in middle of the night.  Afterwards with increased lower back pain.  An x-ray of lumber spine completed, old compression fx noted, unable to exclude new.       Called and discussed POC with daughter, Rae Ndiaye on 5/31.  Patient previously on scheduled and prn dilaudid for pain management, has been effective for pain management per staff and patient.  She infrequently uses prn dilaudid per day at varying times of the day, last used 6/3/18.  No acute neuro changes noted.  Daughter was agreeable to not send patient to neurosurgery, but keep in NH and manage her pain.      I also increased scheduled toileting when seen on 5/31 with patient in effort to reduce future falls, as majority of falls appear r/t patient trying to ambulate self to bathroom      Late onset Alzheimer's disease without behavioral disturbance  Adjustment disorder with mixed anxiety and depressed mood  Protein calorie malnutrition  Primary reason for LTC is dementia, with recent gradual progressive decline-----increasing lethargy, increase in forgetfulness/confusion, not wanting to participate in activities at NH, gradual and slow weight loss.   Increase in anxiety features, her Zoloft was increased on 5/25/18 due to this, and per NH records, no reports of increased anxiety noted in the evenings since dose increase (typically when patient's anxiety increases)        Appetite is poor, continues to loose weight.  Admitted with weight 104lbs in 2015, currently at 84lbs.  She is on juice supplement in the NH and dietary follows.  Interventions in place but limited due to progressing dementia and overall decreased appetite      Wt Readings from Last 5 Encounters:   06/07/18 84 lb 9.6 oz (38.4 kg)   05/31/18 89 lb 9.6 oz (40.6 kg)   05/24/18 87 lb 12.8 oz (39.8 kg)   05/10/18 90 lb 12.8 oz (41.2 kg)   04/16/18 90 lb (40.8 kg)         Hypothyroidism, unspecified type  Per history, on synthroid  Lab Results   Component Value Date    TSH  2.57 03/15/2018       Essential hypertension  Per history, improved with discontinuation of amiodarone last week, previously had some hypotension.  Denies/no reports of chest pain, palpitations        Last 3 BP's: 93/65, 102/50, 104/54      ALLERGIES: No known allergies  PAST MEDICAL HISTORY:  has a past medical history of Aortic stenosis; Atrial fibrillation (H); Back pain; Congestive heart failure (H); Dementia without behavioral disturbance; Hyperkalemia; Hypernatremia; Lumbar compression fracture (H); Osteoporosis; and Subarachnoid hemorrhage (H) (8/10/15).  PAST SURGICAL HISTORY:  has a past surgical history that includes neck injury (years ago); Sigmoidoscopy flexible (12/19/2011); and lower back surgery.  FAMILY HISTORY: family history includes CANCER in her sister and sister; DIABETES in her father and mother; Hypertension in her brother.  SOCIAL HISTORY:  reports that she has never smoked. She has never used smokeless tobacco. She reports that she does not drink alcohol or use illicit drugs.    MEDICATIONS:  Current Outpatient Prescriptions   Medication Sig Dispense Refill     acetaminophen (TYLENOL) 500 MG tablet Take 1,000 mg by mouth as needed for mild pain       acetaminophen (TYLENOL) 500 MG tablet Take 1,000 mg by mouth 3 times daily       aspirin 81 MG chewable tablet Take 81 mg by mouth daily       bisacodyl (DULCOLAX) 10 MG suppository Place 10 mg rectally daily as needed for constipation       Calcium Carbonate-Vitamin D (CALCIUM + D) 600-200 MG-UNIT per tablet Take 1 tablet by mouth 2 times daily.       conjugated estrogens (PREMARIN) vaginal cream Place 0.5 g vaginally twice a week On Monday and Thursday       HYDROmorphone (DILAUDID) 2 MG tablet Take 1 mg by mouth 3 times daily        HYDROmorphone (DILAUDID) 2 MG tablet Take 0.5 mg by mouth 2 times daily as needed for moderate to severe pain        ipratropium - albuterol 0.5 mg/2.5 mg/3 mL (DUONEB) 0.5-2.5 (3) MG/3ML nebulization Take 1 vial  "by nebulization every 6 hours as needed for shortness of breath / dyspnea or wheezing AND give TID until 8/1/2016 then go back to q 6 hrs PRN       Levothyroxine Sodium (SYNTHROID PO) Take 75 mcg by mouth        Menthol-Methyl Salicylate (ICY HOT EXTRA STRENGTH) 10-30 % CREA Externally apply topically 3 times daily       Multiple Vitamin (MULTIVITAMIN) per tablet Take 1 tablet by mouth daily.       pramox-pe-glycerin-petrolatum (PREPARATION H) 1-0.25-14.4-15 % CREA Place rectally 4 times daily as needed for hemorrhoids       senna-docusate (SENOKOT-S;PERICOLACE) 8.6-50 MG per tablet Take 2 tablets by mouth 2 times daily        sertraline (ZOLOFT) 25 MG tablet Take 50 mg by mouth daily        witch hazel-glycerin (TUCKS) pad Apply 1 each topically as needed for hemorrhoids       Medications reviewed:  Medications reconciled to facility chart and changes were made to reflect current medications as identified as above med list. Below are the changes that were made:   Medications stopped since last EPIC medication reconciliation:   There are no discontinued medications.    Medications started since last [facility] medication reconciliation:  No orders of the defined types were placed in this encounter.        Case Management:  I have reviewed the care plan and MDS and do agree with the plan. Patient's desire to return to the community is not present.  Information reviewed:  Medications, vital signs, orders, and nursing notes.    ROS:  10 point ROS of systems including Constitutional, Eyes, Respiratory, Cardiovascular, Gastroenterology, Genitourinary, Integumentary, Muscularskeletal, Psychiatric were all negative except for pertinent positives noted in my HPI.    Exam:  Vitals: BP 93/65  Pulse 88  Temp 97.8  F (36.6  C)  Resp 20  Ht 5' 2\" (1.575 m)  Wt 84 lb 9.6 oz (38.4 kg)  SpO2 90%  BMI 15.47 kg/m2  BMI= Body mass index is 15.47 kg/(m^2).  GENERAL APPEARANCE:  Alert, resting in bed, frail appearing  EYES:  EOM, " conjunctivae, lids, pupils and irises normal  NECK:  No adenopathy,masses or thyromegaly  RESP:  respiratory effort and palpation of chest normal, lungs clear to auscultation---diminished throughout, no respiratory distress  CV:  Palpation and auscultation of heart done, irregular, no edema, +2 pedal pulses, rate-normal, grade 2/6 murmur  ABDOMEN:  normal bowel sounds, soft, nontender, no hepatosplenomegaly or other masses  M/S:   Gait and station abnormal uses walker for ambulation, kyphosis, no edema/erythema of joints, CMS intact, OA changed noted to hands   Digits and nails normal  NEURO:   Cranial nerves 2-12 are normal tested and grossly at patient's baseline, speech clear, no facial asymmetry   PSYCH:  oriented to person, place, insight and judgement impaired, memory impaired , affect and mood normal, no anxiety observed during encounter, pleasant     Lab/Diagnostic data:     CBC RESULTS:   Recent Labs   Lab Test 12/01/17 11/05/15   WBC  8.4  7.0   RBC  3.55*  3.71*   HGB  10.8*  11.0*   HCT  33.2*  33.7*   MCV  94  91   MCH  30.4  29.6   MCHC  32.5  32.6   RDW  15.7*  15.6*   PLT  160  206       Last Basic Metabolic Panel:  Recent Labs   Lab Test 06/04/18 06/02/18 05/31/18 05/28/18   NA   --    --   142  139   POTASSIUM  3.8  3.6  3.3*  3.6   CHLORIDE   --    --   104  104   MARIANO   --    --   9.5  9.4   CO2   --    --   27  26   BUN   --    --   18  19   CR   --    --   0.60  0.56*   GLC   --    --   73  76     TSH   Date Value Ref Range Status   03/15/2018 2.57 0.30 - 5.00 uIU/mL Final   08/28/2017 1.85 0.30 - 5.00 uIU/mL Final       ASSESSMENT/PLAN  (I48.2) Chronic atrial fibrillation (H)  (primary encounter diagnosis)  Comment: Chronic, no changes since d'c of amiodarone   Plan: anticoagulation with baby asa given age and goals of care  -monitor for s/s of RVR and staff to update     (M48.50XD) Compression fracture of vertebra with routine healing, subsequent encounter  Comment: s/p post and history  osteoporosis   Plan: previously discussed with family, want to conservatively manage, focus on pain management with scheduled and prn dilaudid  -scheduled toileting to prevent future falls     (G30.1,  F02.80) Late onset Alzheimer's disease without behavioral disturbance  Comment: Chronic and progressive, expect continual decline in function  Plan: appropriate for LTC---staff assist with cares  -Goal of care comfort     Protein-calorie Malnutrition  Comment: complicated by advancing dementia, continued decrease in appetite  Plan: on supplements, followed by dietary  -monitor weights  -encourage po intake as patient tolerates  -expect continued decline given advancing dementia      (E03.9) Hypothyroidism, unspecified type  Comment: per history   Plan: continue synthroid  -annual TSH     (F43.23) Adjustment disorder with mixed anxiety and depressed mood  Comment: can exacerbate her symptoms of shortness of breath, recent worsening anxiety, Zoloft increased per family request 5/31, appears as of now has improved symptoms   Plan: continue Zoloft, monitor mood, staff to update with concerns  -goal of care comfort, family in agreement with medication as has improved QOL    (I10) Essential hypertension  Comment: chronic, improved (less hypotension) with d'c amiodarone  Plan: monitor BP, BMP Q 3 months     (I35.0) Aortic valve stenosis, unspecified etiology  Comment: chronic, asymptomatic during exam today   Plan: monitor volume status as above, goal of care comfort     (M81.0) Osteoporosis without current pathological fracture, unspecified osteoporosis type  (M15.0) Primary osteoarthritis involving multiple joints  Comment: chronic with good pain control at present  Plan: continue calcium and vitamin D  -scheduled APAP, dilaudid and bowel regimen, family in agreement and has improved QOL by managing pain and adding to comfort          Electronically signed by:  JH Hogan CNP

## 2018-06-07 NOTE — LETTER
6/7/2018        RE: Marce Segura  [address] [facility] [address]          [place] [facility]    Chief Complaint   Patient presents with     penitentiary Regulatory       [place] Medical Record Number:  [id number]    HPI:    Marce Segura is a 89 year old  (3/12/1923), who is being seen today for a federally mandated E/M visit at [facility].  HPI information obtained from: facility chart records, facility staff, patient report and [facility] chart review. Today's concerns are:    Chronic atrial fibrillation (H)  Aortic valve stenosis, unspecified etiology  Noted when hospitalized August 2015, very symptomatic, RVR.  Started on low dose amiodarone.  Cardiologist Dr. Holloway, recommended to continue ammiodarone given patient's underlying aortic stenosis, as she may become very symptomatic.  However, patient has not been seen by cardiology since December 2015.  Both pharmacist and collaborating physician, Dr. Arellano, recommending to discontinue medication, as patient has been in irregular rhythm last several visits, unclear if medication providing benefit, and s/e profile high for this medication.    After discussion with daughter, robert Ndiaye (healthcare agent) agreeable to d'c in February 2018.  No changes in symptoms or rate control since d'c/      Due to age and goals of care patient is anticoagulated with baby aspirin. She denies shortness of breath, palpitations, chest pain, dizziness or lightheadedness.        Pulse Readings from Last 4 Encounters:   06/07/18 88   05/31/18 82   05/24/18 90   05/10/18 74       Compression fracture of vertebra with routine healing, subsequent encounter  Primary osteoarthritis involving multiple joints  Osteoporosis without current pathological fracture, unspecified osteoporosis type  History of osteoporosis, on calcium and vitamin D supplementation.  Still ambulates with 4ww.  OA to multiple joints, most  bothersome include her right wrist and thoracic back.       Fall noted on 5/29/18, trying to ambulate per self to bathroom in middle of the night.  Afterwards with increased lower back pain.  An x-ray of lumber spine completed, old compression fx noted, unable to exclude new.       Called and discussed POC with daughter, Rae Ndiaye on 5/31.  Patient previously on scheduled and prn dilaudid for pain management, has been effective for pain management per staff and patient.   She infrequently uses prn dilaudid per day at varying times of the day, last used 6/3/18.  No acute neuro changes noted.  Daughter  was agreeable to not send patient to neurosurgery, but keep in NH and manage her pain.      I also increased scheduled toileting when seen on 5/31 with patient in effort to reduce future falls, as majority of falls appear r/t patient trying to ambulate self to bathroom      Late onset Alzheimer's disease without behavioral disturbance  Adjustment disorder with mixed anxiety and depressed mood  Protein calorie malnutrition  Primary reason for LTC is dementia, with recent gradual progressive decline-----increasing lethargy, increase in forgetfulness/confusion, not wanting to participate in activities at NH, gradual and slow weight loss.   Increase in anxiety features, her Zoloft was increased on 5/25/18 due to this, and per NH records, no reports of increased anxiety noted in the evenings since dose increase (typically when patient's anxiety increases)        Appetite is poor, continues to loose weight.  Admitted with weight 104lbs in 2015, currently at 84lbs.  She is on juice supplement in the NH and dietary follows.  Interventions in place but limited due to progressing dementia and overall decreased appetite      Wt Readings from Last 5 Encounters:   06/07/18 84 lb 9.6 oz (38.4 kg)   05/31/18 89 lb 9.6 oz (40.6 kg)   05/24/18 87 lb 12.8 oz (39.8 kg)   05/10/18 90 lb 12.8 oz (41.2 kg)   04/16/18 90 lb (40.8 kg)          Hypothyroidism, unspecified type  Per history, on synthroid  Lab Results   Component Value Date    TSH 2.57 03/15/2018       Essential hypertension  Per history, improved with discontinuation of amiodarone last week, previously had some hypotension.  Denies/no reports of chest pain, palpitations        Last 3 BP's: 93/65, 102/50, 104/54      ALLERGIES: No known allergies  PAST MEDICAL HISTORY:  has a past medical history of Aortic stenosis; Atrial fibrillation (H); Back pain; Congestive heart failure (H); Dementia without behavioral disturbance; Hyperkalemia; Hypernatremia; Lumbar compression fracture (H); Osteoporosis; and Subarachnoid hemorrhage (H) (8/10/15).  PAST SURGICAL HISTORY:  has a past surgical history that includes neck injury (years ago); Sigmoidoscopy flexible (12/19/2011); and lower back surgery.  FAMILY HISTORY: family history includes CANCER in her sister and sister; DIABETES in her father and mother; Hypertension in her brother.  SOCIAL HISTORY:  reports that she has never smoked. She has never used smokeless tobacco. She reports that she does not drink alcohol or use illicit drugs.    MEDICATIONS:  Current Outpatient Prescriptions   Medication Sig Dispense Refill     acetaminophen (TYLENOL) 500 MG tablet Take 1,000 mg by mouth as needed for mild pain       acetaminophen (TYLENOL) 500 MG tablet Take 1,000 mg by mouth 3 times daily       aspirin 81 MG chewable tablet Take 81 mg by mouth daily       bisacodyl (DULCOLAX) 10 MG suppository Place 10 mg rectally daily as needed for constipation       Calcium Carbonate-Vitamin D (CALCIUM + D) 600-200 MG-UNIT per tablet Take 1 tablet by mouth 2 times daily.       conjugated estrogens (PREMARIN) vaginal cream Place 0.5 g vaginally twice a week On Monday and Thursday       HYDROmorphone (DILAUDID) 2 MG tablet Take 1 mg by mouth 3 times daily        HYDROmorphone (DILAUDID) 2 MG tablet Take 0.5 mg by mouth 2 times daily as needed for moderate to  "severe pain        ipratropium - albuterol 0.5 mg/2.5 mg/3 mL (DUONEB) 0.5-2.5 (3) MG/3ML nebulization Take 1 vial by nebulization every 6 hours as needed for shortness of breath / dyspnea or wheezing AND give TID until 8/1/2016 then go back to q 6 hrs PRN       Levothyroxine Sodium (SYNTHROID PO) Take 75 mcg by mouth        Menthol-Methyl Salicylate (ICY HOT EXTRA STRENGTH) 10-30 % CREA Externally apply topically 3 times daily       Multiple Vitamin (MULTIVITAMIN) per tablet Take 1 tablet by mouth daily.       pramox-pe-glycerin-petrolatum (PREPARATION H) 1-0.25-14.4-15 % CREA Place rectally 4 times daily as needed for hemorrhoids       senna-docusate (SENOKOT-S;PERICOLACE) 8.6-50 MG per tablet Take 2 tablets by mouth 2 times daily        sertraline (ZOLOFT) 25 MG tablet Take 50 mg by mouth daily        witch hazel-glycerin (TUCKS) pad Apply 1 each topically as needed for hemorrhoids       Medications reviewed:  Medications reconciled to facility chart and changes were made to reflect current medications as identified as above med list. Below are the changes that were made:   Medications stopped since last EPIC medication reconciliation:   There are no discontinued medications.    Medications started since last [facility] medication reconciliation:  No orders of the defined types were placed in this encounter.        Case Management:  I have reviewed the care plan and MDS and do agree with the plan. Patient's desire to return to the community is not present.  Information reviewed:  Medications, vital signs, orders, and nursing notes.    ROS:  10 point ROS of systems including Constitutional, Eyes, Respiratory, Cardiovascular, Gastroenterology, Genitourinary, Integumentary, Muscularskeletal, Psychiatric were all negative except for pertinent positives noted in my HPI.    Exam:  Vitals: BP 93/65  Pulse 88  Temp 97.8  F (36.6  C)  Resp 20  Ht 5' 2\" (1.575 m)  Wt 84 lb 9.6 oz (38.4 kg)  SpO2 90%  BMI 15.47 kg/m2  BMI= " Body mass index is 15.47 kg/(m^2).  GENERAL APPEARANCE:  Alert, resting in bed, frail appearing  EYES:  EOM, conjunctivae, lids, pupils and irises normal  NECK:  No adenopathy,masses or thyromegaly  RESP:  respiratory effort and palpation of chest normal, lungs clear to auscultation---diminished throughout, no respiratory distress  CV:  Palpation and auscultation of heart done, irregular, no edema, +2 pedal pulses, rate-normal, grade 2/6 murmur  ABDOMEN:  normal bowel sounds, soft, nontender, no hepatosplenomegaly or other masses  M/S:   Gait and station abnormal uses walker for ambulation, kyphosis, no edema/erythema of joints, CMS intact, OA changed noted to hands   Digits and nails normal  NEURO:   Cranial nerves 2-12 are normal tested and grossly at patient's baseline, speech clear, no facial asymmetry   PSYCH:  oriented to person, place, insight and judgement impaired, memory impaired , affect and mood normal, no anxiety observed during encounter, pleasant     Lab/Diagnostic data:     CBC RESULTS:   Recent Labs   Lab Test 12/01/17 11/05/15   WBC  8.4  7.0   RBC  3.55*  3.71*   HGB  10.8*  11.0*   HCT  33.2*  33.7*   MCV  94  91   MCH  30.4  29.6   MCHC  32.5  32.6   RDW  15.7*  15.6*   PLT  160  206       Last Basic Metabolic Panel:  Recent Labs   Lab Test 06/04/18 06/02/18 05/31/18 05/28/18   NA   --    --   142  139   POTASSIUM  3.8  3.6  3.3*  3.6   CHLORIDE   --    --   104  104   MARIANO   --    --   9.5  9.4   CO2   --    --   27  26   BUN   --    --   18  19   CR   --    --   0.60  0.56*   GLC   --    --   73  76     TSH   Date Value Ref Range Status   03/15/2018 2.57 0.30 - 5.00 uIU/mL Final   08/28/2017 1.85 0.30 - 5.00 uIU/mL Final       ASSESSMENT/PLAN  (I48.2) Chronic atrial fibrillation (H)  (primary encounter diagnosis)  Comment: Chronic, no changes since d'c of amiodarone   Plan: anticoagulation with baby asa given age and goals of care  -monitor for s/s of RVR and staff to update     (M48.50XD)  Compression fracture of vertebra with routine healing, subsequent encounter  Comment: s/p post and history osteoporosis   Plan: previously discussed with family, want to conservatively manage, focus on pain management with scheduled and prn dilaudid  -scheduled toileting to prevent future falls     (G30.1,  F02.80) Late onset Alzheimer's disease without behavioral disturbance  Comment: Chronic and progressive, expect continual decline in function  Plan: appropriate for LTC---staff assist with cares  -Goal of care comfort     Protein-calorie Malnutrition  Comment: complicated by advancing dementia, continued decrease in appetite  Plan: on supplements, followed by dietary  -monitor weights  -encourage po intake as patient tolerates  -expect continued decline given advancing dementia      (E03.9) Hypothyroidism, unspecified type  Comment: per history   Plan: continue synthroid  -annual TSH     (F43.23) Adjustment disorder with mixed anxiety and depressed mood  Comment: can exacerbate her symptoms of shortness of breath, recent worsening anxiety, Zoloft increased per family request 5/31, appears as of now has improved symptoms   Plan: continue Zoloft, monitor mood, staff to update with concerns  -goal of care comfort, family in agreement with medication as has improved QOL    (I10) Essential hypertension  Comment: chronic, improved (less hypotension) with d'c amiodarone  Plan: monitor BP, BMP Q 3 months     (I35.0) Aortic valve stenosis, unspecified etiology  Comment: chronic, asymptomatic during exam today   Plan: monitor volume status as above, goal of care comfort     (M81.0) Osteoporosis without current pathological fracture, unspecified osteoporosis type  (M15.0) Primary osteoarthritis involving multiple joints  Comment: chronic with good pain control at present  Plan: continue calcium and vitamin D  -scheduled APAP, dilaudid and bowel regimen, family in agreement and has improved QOL by managing pain and adding to  comfort          Electronically signed by:  JH Hogan CNP        Sincerely,        JH Hogan CNP

## 2018-07-16 NOTE — PROGRESS NOTES
[facility]  Chief Complaint   Patient presents with     Annual Comprehensive Nursing Home       [facility] Medical Record Number:  [id number]    HPI:    Marce Segura is a 89 year old  (3/12/1923), who is being seen today for an annual comprehensive visit at [facility].  HPI information obtained from: facility chart records, facility staff, patient report and [facility] Epic chart review.  Today's concerns are:    Chronic atrial fibrillation (H)  Aortic valve stenosis, unspecified etiology  Noted when hospitalized August 2015, very symptomatic, RVR.  Started on low dose amiodarone.  Cardiologist Dr. Holloway, recommended to continue ammiodarone given patient's underlying aortic stenosis, as she may become very symptomatic.  However, patient has not been seen by cardiology since December 2015.  Both pharmacist and collaborating physician, Dr. Arellano, recommended to discontinue medication, as patient has been in irregular rhythm last several visits, unclear if medication providing benefit, and s/e profile high for this medication.     After discussion with daughter, robert Ndiaye (healthcare agent) agreeable to d'c in February 2018.  No changes in symptoms or rate control since d'c/      Due to age and goals of care patient is anticoagulated with baby aspirin. She denies shortness of breath, palpitations, chest pain, dizziness or lightheadedness.        Pulse Readings from Last 4 Encounters:   07/16/18 80   06/07/18 88   05/31/18 82   05/24/18 90       Protein-calorie malnutrition, unspecified severity (H)  Late onset Alzheimer's disease without behavioral disturbance  Primary reason for LTC is dementia, with recent gradual progressive decline-----increasing lethargy, increase in forgetfulness/confusion, not wanting to participate in activities at NH, gradual and slow weight loss.   Increase in anxiety features, her Zoloft was increased on 5/25/18 due to this, and per NH  records, no reports of increased anxiety noted in the evenings since dose increase (typically when patient's anxiety increases)        Appetite is poor, continues to loose weight.  Admitted with weight 104lbs in 2015, currently at 89lbs.  Body mass index is 16.28 kg/(m^2). She is on juice supplement in the NH and dietary follows.  Interventions in place but limited due to progressing dementia and overall decreased appetite      Wt Readings from Last 5 Encounters:   07/16/18 89 lb (40.4 kg)   06/07/18 84 lb 9.6 oz (38.4 kg)   05/31/18 89 lb 9.6 oz (40.6 kg)   05/24/18 87 lb 12.8 oz (39.8 kg)   05/10/18 90 lb 12.8 oz (41.2 kg)       Hypothyroidism due to medication  Per history, on synthroid  Lab Results   Component Value Date    TSH 2.57 03/15/2018         Compression fracture of vertebra with routine healing, subsequent encounter  Primary osteoarthritis involving multiple joints  Osteoporosis without current pathological fracture, unspecified osteoporosis type  History of osteoporosis, on calcium and vitamin D supplementation.  Still ambulates with 4ww.  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        Fall noted on 5/29/18, trying to ambulate per self to bathroom in middle of the night.  Afterwards with increased lower back pain.  An x-ray of lumber spine completed, old compression fx noted, unable to exclude new.        Called and discussed POC with daughter, Rae Ndiaye on 5/31.  Patient previously on scheduled and prn dilaudid for pain management, has been effective for pain management per staff and patient.  She infrequently uses prn dilaudid per day at varying times of the day.  No acute neuro changes noted.  Daughter was agreeable to not send patient to neurosurgery, but keep in NH and manage her pain.       I also increased scheduled toileting when seen on 5/31 with patient in effort to reduce future falls, as majority of falls appear r/t patient trying to ambulate self  to bathroom.  No recent falls noted          Adjustment disorder with mixed anxiety and depressed mood  Chronic anxiety, disruptive to patient, family was agreeable to start Zoloft, began December 2015, dose recently increased due to worsening anxiety as noted above.  Staff and family feel has been helpful for patient, no adverse effects currently noted.  Goal of care comfort.  Today patient denies anxiety, sleeping well.  Still becomes anxious at times and can exacerbate her symptoms and increase her shortness of breath, has been stable.  Currently on low dose Zoloft, discussed with family and they are not interested in GDR, feel Zoloft has managed patient's anxiety well, goals of care of comfort, no adverse effects noted.  I am in agreement with this       Essential hypertension  Per history with some hypotension that has improved with discontinuation of amiodarone.  Denies/no reports of chest pain, palpitations    Last 2 BP's: 115/62, 119/72  Lab Results   Component Value Date    CR 0.60 05/31/2018     Lab Results   Component Value Date    POTASSIUM 3.8 06/04/2018           Atrophic vaginitis  Patent with hx frequent urination, bothersome to her and worsening.  Exam revealed atrophic vaginitis, started on Premarin cream.  Per staff has been effective for patient.  Less frequency and urge to.  Denies/no reports of fever, chills, dysuria, pain from vaginal area.  Wears depends and pad during the day          ALLERGIES: No known allergies  PROBLEM LIST:  Patient Active Problem List   Diagnosis     Advance Care Planning     Thoracic vertebral fracture (H)     Lumbar vertebral fracture (H)     Health Care Home     Altered mental state     Subarachnoid hemorrhage (H)     Physical deconditioning     Dysphagia     Aspiration pneumonia (H)     Constipation     Aortic stenosis     Chronic atrial fibrillation (H)     Anxiety     Hypothyroidism, unspecified type     Late onset Alzheimer's disease without behavioral  disturbance     Atrophic vaginitis     Protein-calorie malnutrition (H)     Chronic diastolic congestive heart failure (H)     Aortic valve stenosis, unspecified etiology     Adjustment disorder with anxious mood     Age-related osteoporosis without current pathological fracture     PAST MEDICAL HISTORY:  has a past medical history of Aortic stenosis; Atrial fibrillation (H); Back pain; Congestive heart failure (H); Dementia without behavioral disturbance; Hyperkalemia; Hypernatremia; Lumbar compression fracture (H); Osteoporosis; and Subarachnoid hemorrhage (H) (8/10/15).  PAST SURGICAL HISTORY:  has a past surgical history that includes neck injury (years ago); Sigmoidoscopy flexible (12/19/2011); and lower back surgery.  FAMILY HISTORY: family history includes Cancer in her sister and sister; Diabetes in her father and mother; Hypertension in her brother.  SOCIAL HISTORY:  reports that she has never smoked. She has never used smokeless tobacco. She reports that she does not drink alcohol or use illicit drugs.  IMMUNIZATIONS:  Most Recent Immunizations   Administered Date(s) Administered     Influenza (High Dose) 3 valent vaccine 10/18/2016     Influenza (IIV3) PF 10/06/2015     Pneumo Conj 13-V (2010&after) 10/26/2015     Pneumococcal 23 valent 04/02/2012   Deferred Date(s) Deferred     Tdap (Adacel,Boostrix) 11/30/2016     Above immunizations pulled from [facility]. [facility] and facility records also reconciled. Outstanding information sent to  to update [facility].  Future immunizations are not needed at this point as all recommended immunizations are up to date.   MEDICATIONS:  Current Outpatient Prescriptions   Medication Sig Dispense Refill     acetaminophen (TYLENOL) 500 MG tablet Take 1,000 mg by mouth as needed for mild pain       acetaminophen (TYLENOL) 500 MG tablet Take 1,000 mg by mouth 3 times daily       aspirin 81 MG chewable tablet Take 81 mg by mouth daily       bisacodyl  (DULCOLAX) 10 MG suppository Place 10 mg rectally daily as needed for constipation       Calcium Carbonate-Vitamin D (CALCIUM + D) 600-200 MG-UNIT per tablet Take 1 tablet by mouth 2 times daily.       conjugated estrogens (PREMARIN) vaginal cream Place 0.5 g vaginally twice a week On Monday and Thursday       HYDROmorphone (DILAUDID) 2 MG tablet Take 1 mg by mouth 3 times daily        HYDROmorphone (DILAUDID) 2 MG tablet Take 0.5 mg by mouth 2 times daily as needed for moderate to severe pain        ipratropium - albuterol 0.5 mg/2.5 mg/3 mL (DUONEB) 0.5-2.5 (3) MG/3ML nebulization Take 1 vial by nebulization every 6 hours as needed for shortness of breath / dyspnea or wheezing AND give TID until 8/1/2016 then go back to q 6 hrs PRN       Levothyroxine Sodium (SYNTHROID PO) Take 75 mcg by mouth        Menthol-Methyl Salicylate (ICY HOT EXTRA STRENGTH) 10-30 % CREA Externally apply topically 3 times daily       Multiple Vitamin (MULTIVITAMIN) per tablet Take 1 tablet by mouth daily.       pramox-pe-glycerin-petrolatum (PREPARATION H) 1-0.25-14.4-15 % CREA Place rectally 4 times daily as needed for hemorrhoids       senna-docusate (SENOKOT-S;PERICOLACE) 8.6-50 MG per tablet Take 2 tablets by mouth 2 times daily        sertraline (ZOLOFT) 25 MG tablet Take 50 mg by mouth daily        witch hazel-glycerin (TUCKS) pad Apply 1 each topically as needed for hemorrhoids       Medications reviewed:  Medications reconciled to facility chart and changes were made to reflect current medications as identified as above med list. Below are the changes that were made:   Medications stopped since last EPIC medication reconciliation:   There are no discontinued medications.    Medications started since last [facility] medication reconciliation:  No orders of the defined types were placed in this encounter.        Case Management:  I have reviewed the facility/SNF care plan/MDS which was done 7/9/18, including the falls risk, nutrition and  "pain screening. I also reviewed the current immunizations, and preventive care..Future cancer screening is not clinically indicated secondary to age/goals of care Patient's desire to return to the community is not present. Current Level of Care is appropriate.    Advance Directive Discussion:    I reviewed the current advanced directives as reflected in EPIC, the POLST and the facility chart, and verified the congruency of orders. I contacted the first party daughter, Rae Ndiaye and left a message regarding the plan of Care.  I did not due to cognitive impairment review the advance directives with the resident.     Team Discussion:  I communicated with the appropriate disciplines involved with the Plan of Care:   Nursing      Patient Goal:  Patient's goal is unobtainable secondary to cognitive impairment.    Information reviewed:  Medications, vital signs, orders, and nursing notes.    ROS:  Limited secondary to cognitive impairment but today pt reports doing well, denies pain     Exam:  /62  Pulse 80  Temp 98.3  F (36.8  C)  Resp 15  Ht 5' 2\" (1.575 m)  Wt 89 lb (40.4 kg)  SpO2 92%  BMI 16.28 kg/m2  GENERAL APPEARANCE:  Alert, in no distress  EYES:  EOM, conjunctivae, lids, pupils and irises normal  NECK:  No adenopathy,masses or thyromegaly  RESP:  respiratory effort and palpation of chest normal, lungs clear to auscultation but diminished throughout, no respiratory distress  CV:  Palpation and auscultation of heart done , rate normal, no edema, +2 pedal pulses, rate-normal, grade 2/6 murmur  ABDOMEN:  normal bowel sounds, soft, nontender, no hepatosplenomegaly or other masses  M/S:   Gait and station abnormal uses walker for ambulation, kyphosis, no edema/erythema of joints, CMS intact, bunion left foot--no open areas  Digits and nails normal  SKIN:  Inspection of skin and subcutaneous tissue baseline  NEURO:   Cranial nerves 2-12 are normal tested and grossly at patient's baseline, speech clear, no " facial asymmetry   PSYCH:  oriented to person, place, insight and judgement impaired, memory impaired , affect and mood normal, no anxiety observed during encounter     Lab/Diagnostic data:      CBC RESULTS:   Recent Labs   Lab Test 12/01/17 11/05/15   WBC  8.4  7.0   RBC  3.55*  3.71*   HGB  10.8*  11.0*   HCT  33.2*  33.7*   MCV  94  91   MCH  30.4  29.6   MCHC  32.5  32.6   RDW  15.7*  15.6*   PLT  160  206       Last Basic Metabolic Panel:  Recent Labs   Lab Test 06/04/18 06/02/18 05/31/18 05/28/18   NA   --    --   142  139   POTASSIUM  3.8  3.6  3.3*  3.6   CHLORIDE   --    --   104  104   MARIANO   --    --   9.5  9.4   CO2   --    --   27  26   BUN   --    --   18  19   CR   --    --   0.60  0.56*   GLC   --    --   73  76       TSH   Date Value Ref Range Status   03/15/2018 2.57 0.30 - 5.00 uIU/mL Final   08/28/2017 1.85 0.30 - 5.00 uIU/mL Final       ASSESSMENT/PLAN  (I48.2) Chronic atrial fibrillation (H)  (primary encounter diagnosis)  Comment: Chronic, no changes since d'c of amiodarone   Plan: anticoagulation with baby asa given age and goals of care  -monitor for s/s of RVR and staff to update     (E46) Protein-calorie malnutrition, unspecified severity (H)  Comment: complicated by advancing dementia, continued decrease in appetite  Plan: on supplements, followed by dietary  -monitor weights  -encourage po intake as patient tolerates  -expect continued decline given advancing dementia     (E03.2) Hypothyroidism due to medication  Comment: per history   Plan: continue synthroid  -annual TSH    (M48.50XD) Compression fracture of vertebra with routine healing, subsequent encounter  Comment: noted previously, and history osteoporosis, pain well managed today per patient and staff  Plan: previously discussed with family, want to conservatively manage, focus on pain management with scheduled and prn dilaudid  -scheduled toileting to prevent future falls     (G30.1,  F02.80) Late onset Alzheimer's disease  without behavioral disturbance  Comment: Chronic and progressive, expect continual decline in function  Plan: appropriate for LTC---staff assist with cares  -Goal of care comfort       (F43.23) Adjustment disorder with mixed anxiety and depressed mood  Comment: can exacerbate her symptoms of shortness of breath, dose increase in June due to worsening anxiety, dose increase appears to be effective   Plan: continue Zoloft, monitor mood, staff to update with concerns  -goal of care comfort, family in agreement with medication as has improved QOL    (I10) Essential hypertension  BP goals are <150/90 mm Hg.This is higher than ACC and AHA recommendations due to goals of care, risk for hypotension, risk of dizziness and falls and frailty. Patient is stable and continue without pharmacological invention with routine assessment.    (M81.0) Osteoporosis without current pathological fracture, unspecified osteoporosis type  (M15.0) Primary osteoarthritis involving multiple joints  Comment: chronic with good pain control at present  Plan: continue calcium and vitamin D  -scheduled APAP, icy hot, dilaudid and bowel regimen, family in agreement and has improved QOL by managing pain and adding to comfort      (I35.0) Nonrheumatic aortic valve stenosis  Comment: Chronic, stable  Plan: as above    (N95.2) Atrophic vaginitis  Comment: per history, symptoms improved with Premarin cream  Plan: continue premarin       Electronically signed by:  JH Hogan CNP

## 2018-07-16 NOTE — LETTER
7/16/2018        RE: Marce Segura  [address] [facility] [address]        [facility]  Chief Complaint   Patient presents with     Annual Comprehensive Nursing Home       [facility] Medical Record Number:  [id number]    HPI:    Marce Segura is a 89 year old  (3/12/1923), who is being seen today for an annual comprehensive visit at [facility].  HPI information obtained from: facility chart records, facility staff, patient report and [facility] chart review.  Today's concerns are:    Chronic atrial fibrillation (H)  Aortic valve stenosis, unspecified etiology  Noted when hospitalized August 2015, very symptomatic, RVR.  Started on low dose amiodarone.  Cardiologist Dr. Holloway, recommended to continue ammiodarone given patient's underlying aortic stenosis, as she may become very symptomatic.  However, patient has not been seen by cardiology since December 2015.  Both pharmacist and collaborating physician, Dr. Arellano, recommended to discontinue medication, as patient has been in irregular rhythm last several visits, unclear if medication providing benefit, and s/e profile high for this medication.     After discussion with daughter, robert Ndiaye (healthcare agent) agreeable to d'c in February 2018.  No changes in symptoms or rate control since d'c/      Due to age and goals of care patient is anticoagulated with baby aspirin. She denies shortness of breath, palpitations, chest pain, dizziness or lightheadedness.        Pulse Readings from Last 4 Encounters:   07/16/18 80   06/07/18 88   05/31/18 82   05/24/18 90       Protein-calorie malnutrition, unspecified severity (H)  Late onset Alzheimer's disease without behavioral disturbance  Primary reason for LTC is dementia, with recent gradual progressive decline-----increasing lethargy, increase in forgetfulness/confusion, not wanting to participate in activities at NH, gradual and slow weight  loss.   Increase in anxiety features, her Zoloft was increased on 5/25/18 due to this, and per NH records, no reports of increased anxiety noted in the evenings since dose increase (typically when patient's anxiety increases)        Appetite is poor, continues to loose weight.  Admitted with weight 104lbs in 2015, currently at 89lbs.  Body mass index is 16.28 kg/(m^2). She is on juice supplement in the NH and dietary follows.  Interventions in place but limited due to progressing dementia and overall decreased appetite      Wt Readings from Last 5 Encounters:   07/16/18 89 lb (40.4 kg)   06/07/18 84 lb 9.6 oz (38.4 kg)   05/31/18 89 lb 9.6 oz (40.6 kg)   05/24/18 87 lb 12.8 oz (39.8 kg)   05/10/18 90 lb 12.8 oz (41.2 kg)       Hypothyroidism due to medication  Per history, on synthroid  Lab Results   Component Value Date    TSH 2.57 03/15/2018         Compression fracture of vertebra with routine healing, subsequent encounter  Primary osteoarthritis involving multiple joints  Osteoporosis without current pathological fracture, unspecified osteoporosis type  History of osteoporosis, on calcium and vitamin D supplementation.  Still ambulates with 4ww.  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        Fall noted on 5/29/18, trying to ambulate per self to bathroom in middle of the night.  Afterwards with increased lower back pain.  An x-ray of lumber spine completed, old compression fx noted, unable to exclude new.        Called and discussed POC with daughter, Rae Ndiaye on 5/31.  Patient previously on scheduled and prn dilaudid for pain management, has been effective for pain management per staff and patient.  She infrequently uses prn dilaudid per day at varying times of the day.  No acute neuro changes noted.  Daughter was agreeable to not send patient to neurosurgery, but keep in NH and manage her pain.       I also increased scheduled toileting when seen on 5/31 with patient  in effort to reduce future falls, as majority of falls appear r/t patient trying to ambulate self to bathroom.  No recent falls noted          Adjustment disorder with mixed anxiety and depressed mood  Chronic anxiety, disruptive to patient, family was agreeable to start Zoloft, began December 2015, dose recently increased due to worsening anxiety as noted above.  Staff and family feel has been helpful for patient, no adverse effects currently noted.  Goal of care comfort.  Today patient denies anxiety, sleeping well.  Still becomes anxious at times and can exacerbate her symptoms and increase her shortness of breath, has been stable.  Currently on low dose Zoloft, discussed with family and they are not interested in GDR, feel Zoloft has managed patient's anxiety well, goals of care of comfort, no adverse effects noted.  I am in agreement with this       Essential hypertension  Per history with some hypotension that has improved with discontinuation of amiodarone.  Denies/no reports of chest pain, palpitations    Last 2 BP's: 115/62, 119/72  Lab Results   Component Value Date    CR 0.60 05/31/2018     Lab Results   Component Value Date    POTASSIUM 3.8 06/04/2018           Atrophic vaginitis  Patent with hx frequent urination, bothersome to her and worsening.  Exam revealed atrophic vaginitis, started on Premarin cream.  Per staff has been effective for patient.  Less frequency and urge to.  Denies/no reports of fever, chills, dysuria, pain from vaginal area.  Wears depends and pad during the day          ALLERGIES: No known allergies  PROBLEM LIST:  Patient Active Problem List   Diagnosis     Advance Care Planning     Thoracic vertebral fracture (H)     Lumbar vertebral fracture (H)     Health Care Home     Altered mental state     Subarachnoid hemorrhage (H)     Physical deconditioning     Dysphagia     Aspiration pneumonia (H)     Constipation     Aortic stenosis     Chronic atrial fibrillation (H)     Anxiety      Hypothyroidism, unspecified type     Late onset Alzheimer's disease without behavioral disturbance     Atrophic vaginitis     Protein-calorie malnutrition (H)     Chronic diastolic congestive heart failure (H)     Aortic valve stenosis, unspecified etiology     Adjustment disorder with anxious mood     Age-related osteoporosis without current pathological fracture     PAST MEDICAL HISTORY:  has a past medical history of Aortic stenosis; Atrial fibrillation (H); Back pain; Congestive heart failure (H); Dementia without behavioral disturbance; Hyperkalemia; Hypernatremia; Lumbar compression fracture (H); Osteoporosis; and Subarachnoid hemorrhage (H) (8/10/15).  PAST SURGICAL HISTORY:  has a past surgical history that includes neck injury (years ago); Sigmoidoscopy flexible (12/19/2011); and lower back surgery.  FAMILY HISTORY: family history includes Cancer in her sister and sister; Diabetes in her father and mother; Hypertension in her brother.  SOCIAL HISTORY:  reports that she has never smoked. She has never used smokeless tobacco. She reports that she does not drink alcohol or use illicit drugs.  IMMUNIZATIONS:  Most Recent Immunizations   Administered Date(s) Administered     Influenza (High Dose) 3 valent vaccine 10/18/2016     Influenza (IIV3) PF 10/06/2015     Pneumo Conj 13-V (2010&after) 10/26/2015     Pneumococcal 23 valent 04/02/2012   Deferred Date(s) Deferred     Tdap (Adacel,Boostrix) 11/30/2016     Above immunizations pulled from [facility]. [facility] and facility records also reconciled. Outstanding information sent to  to update [facility].  Future immunizations are not needed at this point as all recommended immunizations are up to date.   MEDICATIONS:  Current Outpatient Prescriptions   Medication Sig Dispense Refill     acetaminophen (TYLENOL) 500 MG tablet Take 1,000 mg by mouth as needed for mild pain       acetaminophen (TYLENOL) 500 MG tablet Take 1,000 mg by mouth 3 times  daily       aspirin 81 MG chewable tablet Take 81 mg by mouth daily       bisacodyl (DULCOLAX) 10 MG suppository Place 10 mg rectally daily as needed for constipation       Calcium Carbonate-Vitamin D (CALCIUM + D) 600-200 MG-UNIT per tablet Take 1 tablet by mouth 2 times daily.       conjugated estrogens (PREMARIN) vaginal cream Place 0.5 g vaginally twice a week On Monday and Thursday       HYDROmorphone (DILAUDID) 2 MG tablet Take 1 mg by mouth 3 times daily        HYDROmorphone (DILAUDID) 2 MG tablet Take 0.5 mg by mouth 2 times daily as needed for moderate to severe pain        ipratropium - albuterol 0.5 mg/2.5 mg/3 mL (DUONEB) 0.5-2.5 (3) MG/3ML nebulization Take 1 vial by nebulization every 6 hours as needed for shortness of breath / dyspnea or wheezing AND give TID until 8/1/2016 then go back to q 6 hrs PRN       Levothyroxine Sodium (SYNTHROID PO) Take 75 mcg by mouth        Menthol-Methyl Salicylate (ICY HOT EXTRA STRENGTH) 10-30 % CREA Externally apply topically 3 times daily       Multiple Vitamin (MULTIVITAMIN) per tablet Take 1 tablet by mouth daily.       pramox-pe-glycerin-petrolatum (PREPARATION H) 1-0.25-14.4-15 % CREA Place rectally 4 times daily as needed for hemorrhoids       senna-docusate (SENOKOT-S;PERICOLACE) 8.6-50 MG per tablet Take 2 tablets by mouth 2 times daily        sertraline (ZOLOFT) 25 MG tablet Take 50 mg by mouth daily        witch hazel-glycerin (TUCKS) pad Apply 1 each topically as needed for hemorrhoids       Medications reviewed:  Medications reconciled to facility chart and changes were made to reflect current medications as identified as above med list. Below are the changes that were made:   Medications stopped since last EPIC medication reconciliation:   There are no discontinued medications.    Medications started since last [facility] medication reconciliation:  No orders of the defined types were placed in this encounter.        Case Management:  I have reviewed the  "facility/SNF care plan/MDS which was done 7/9/18, including the falls risk, nutrition and pain screening. I also reviewed the current immunizations, and preventive care..Future cancer screening is not clinically indicated secondary to age/goals of care Patient's desire to return to the community is not present. Current Level of Care is appropriate.    Advance Directive Discussion:    I reviewed the current advanced directives as reflected in EPIC, the POLST and the facility chart, and verified the congruency of orders. I contacted the first party daughter, Rae Ndiaye and left a message regarding the plan of Care.  I did not due to cognitive impairment review the advance directives with the resident.     Team Discussion:  I communicated with the appropriate disciplines involved with the Plan of Care:   Nursing      Patient Goal:  Patient's goal is unobtainable secondary to cognitive impairment.    Information reviewed:  Medications, vital signs, orders, and nursing notes.    ROS:  Limited secondary to cognitive impairment but today pt reports doing well, denies pain     Exam:  /62  Pulse 80  Temp 98.3  F (36.8  C)  Resp 15  Ht 5' 2\" (1.575 m)  Wt 89 lb (40.4 kg)  SpO2 92%  BMI 16.28 kg/m2  GENERAL APPEARANCE:  Alert, in no distress  EYES:  EOM, conjunctivae, lids, pupils and irises normal  NECK:  No adenopathy,masses or thyromegaly  RESP:  respiratory effort and palpation of chest normal, lungs clear to auscultation but diminished throughout, no respiratory distress  CV:  Palpation and auscultation of heart done , rate normal, no edema, +2 pedal pulses, rate-normal, grade 2/6 murmur  ABDOMEN:  normal bowel sounds, soft, nontender, no hepatosplenomegaly or other masses  M/S:   Gait and station abnormal uses walker for ambulation, kyphosis, no edema/erythema of joints, CMS intact, bunion left foot--no open areas  Digits and nails normal  SKIN:  Inspection of skin and subcutaneous tissue baseline  NEURO:   " Cranial nerves 2-12 are normal tested and grossly at patient's baseline, speech clear, no facial asymmetry   PSYCH:  oriented to person, place, insight and judgement impaired, memory impaired , affect and mood normal, no anxiety observed during encounter     Lab/Diagnostic data:      CBC RESULTS:   Recent Labs   Lab Test 12/01/17 11/05/15   WBC  8.4  7.0   RBC  3.55*  3.71*   HGB  10.8*  11.0*   HCT  33.2*  33.7*   MCV  94  91   MCH  30.4  29.6   MCHC  32.5  32.6   RDW  15.7*  15.6*   PLT  160  206       Last Basic Metabolic Panel:  Recent Labs   Lab Test 06/04/18 06/02/18 05/31/18 05/28/18   NA   --    --   142  139   POTASSIUM  3.8  3.6  3.3*  3.6   CHLORIDE   --    --   104  104   MARIANO   --    --   9.5  9.4   CO2   --    --   27  26   BUN   --    --   18  19   CR   --    --   0.60  0.56*   GLC   --    --   73  76       TSH   Date Value Ref Range Status   03/15/2018 2.57 0.30 - 5.00 uIU/mL Final   08/28/2017 1.85 0.30 - 5.00 uIU/mL Final       ASSESSMENT/PLAN  (I48.2) Chronic atrial fibrillation (H)  (primary encounter diagnosis)  Comment: Chronic, no changes since d'c of amiodarone   Plan: anticoagulation with baby asa given age and goals of care  -monitor for s/s of RVR and staff to update     (E46) Protein-calorie malnutrition, unspecified severity (H)  Comment: complicated by advancing dementia, continued decrease in appetite  Plan: on supplements, followed by dietary  -monitor weights  -encourage po intake as patient tolerates  -expect continued decline given advancing dementia     (E03.2) Hypothyroidism due to medication  Comment: per history   Plan: continue synthroid  -annual TSH    (M48.50XD) Compression fracture of vertebra with routine healing, subsequent encounter  Comment: noted previously, and history osteoporosis, pain well managed today per patient and staff  Plan: previously discussed with family, want to conservatively manage, focus on pain management with scheduled and prn dilaudid  -scheduled  toileting to prevent future falls     (G30.1,  F02.80) Late onset Alzheimer's disease without behavioral disturbance  Comment: Chronic and progressive, expect continual decline in function  Plan: appropriate for LTC---staff assist with cares  -Goal of care comfort       (F43.23) Adjustment disorder with mixed anxiety and depressed mood  Comment: can exacerbate her symptoms of shortness of breath, dose increase in June due to worsening anxiety, dose increase appears to be effective   Plan: continue Zoloft, monitor mood, staff to update with concerns  -goal of care comfort, family in agreement with medication as has improved QOL    (I10) Essential hypertension  BP goals are <150/90 mm Hg.This is higher than ACC and AHA recommendations due to goals of care, risk for hypotension, risk of dizziness and falls and frailty. Patient is stable and continue without pharmacological invention with routine assessment.    (M81.0) Osteoporosis without current pathological fracture, unspecified osteoporosis type  (M15.0) Primary osteoarthritis involving multiple joints  Comment: chronic with good pain control at present  Plan: continue calcium and vitamin D  -scheduled APAP, icy hot, dilaudid and bowel regimen, family in agreement and has improved QOL by managing pain and adding to comfort      (I35.0) Nonrheumatic aortic valve stenosis  Comment: Chronic, stable  Plan: as above    (N95.2) Atrophic vaginitis  Comment: per history, symptoms improved with Premarin cream  Plan: continue premarin       Electronically signed by:  JH Hogan CNP          Sincerely,        JH Hogan CNP

## 2018-08-06 NOTE — LETTER
"    8/6/2018        RE: Marce Segura  [address] [facility] [address]        Marce Segura is a 89 year old female seen August 6, 2018 at [facility] where she has resided for 3 years (admit 9/2015) seen to follow up atrial fibrillation, chronic diastolic CHF and dementia.     Patient is seen in her room resting abed.   She has difficulty hearing and understanding questions, but is able to make her needs known.  Nursing staff reports some general decline, cognitively and physically.   Will be in the hallway and ask \"Where's my room?\"   Eating has diminished and weight is down (since admit she is down 20#).  She was previously ambulating to the DR with her walker, now in WC.  Spends more time in bed.      Patient has diastolic CHF with LVH and mod AS on last ECHO.    She had an episode of afib during a 2015 hospitalization.   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       Past Medical History:   Diagnosis Date     Aortic stenosis     7/2014 Mod AS per Echo     Atrial fibrillation (H)      Back pain      Congestive heart failure (H)      Dementia without behavioral disturbance      Hyperkalemia      Hypernatremia      Lumbar compression fracture (H)      Osteoporosis      Subarachnoid hemorrhage (H) 8/10/15    C1 vertebra     SH:  , son Abraham is first contact.   She also has a daughter Rae Ndiaye  Reports she was one of 10 children in her family.       ROS:  Limited, as above.   Wt Readings from Last 5 Encounters:   08/06/18 85 lb 12.8 oz (38.9 kg)   07/16/18 89 lb (40.4 kg)   06/07/18 84 lb 9.6 oz (38.4 kg)   05/31/18 89 lb 9.6 oz (40.6 kg)   05/24/18 87 lb 12.8 oz (39.8 kg)        EXAM:  Frail, NAD  /66  Pulse 69  Temp 97.5  F (36.4  C)  Resp 14  Ht 5' 2\" (1.575 m)  Wt 85 lb 12.8 oz (38.9 kg)  SpO2 92%  BMI 15.69 kg/m2   Neck supple without adeanopathy  +kyphoscoliosis  Lungs decreased BS, no rales or " wheeze  Heart irreg irreg, s1s2@80, 2/6 NATHEN  Abd soft, NT, no distention, +BS  Ext without edema.  Mild deforming changes of OA.    Neuro: limited verbal skills, STML  Psych: affect okay    Last Comprehensive Metabolic Panel:  Sodium   Date Value Ref Range Status   05/31/2018 142 136 - 145 mmol/L Final     Potassium   Date Value Ref Range Status   06/04/2018 3.8 3.5 - 5.0 mmol/L Final     Chloride   Date Value Ref Range Status   05/31/2018 104 98 - 107 mmol/L Final     Carbon Dioxide   Date Value Ref Range Status   05/31/2018 27 22 - 31 mmol/L Final     Anion Gap   Date Value Ref Range Status   05/31/2018 11 5 - 18 mmol/L Final     Glucose   Date Value Ref Range Status   05/31/2018 73 70 - 125 mg/dL Final     Urea Nitrogen   Date Value Ref Range Status   05/31/2018 18 8 - 28 mg/dL Final     Creatinine   Date Value Ref Range Status   05/31/2018 0.60 0.60 - 1.10 mg/dL Final     GFR Estimate   Date Value Ref Range Status   05/31/2018 >60 >60 ml/min/1.73m2 Final     Calcium   Date Value Ref Range Status   05/31/2018 9.5 8.5 - 10.5 mg/dL Final        IMP/PLAN:  (R63.4) Loss of weight   Comment: eating less, cognitive decline  Plan: nutritional supplements, supportive care.        (I48.2) Chronic atrial fibrillation (H)    Comment: controlled VR without rate slowing medications; stable off amiodarone  Plan: daily ASA for stroke prophylaxis.      (I35.0) Nonrheumatic aortic valve stenosis  (I50.32) Chronic diastolic congestive heart failure (H)  Comment:  no apparent CV symptoms today   Plan: continue to monitor volume status; goal is comfort       (G30.1,  F02.80) Late onset Alzheimer's disease without behavioral disturbance  Comment: with anxious features, ongoing overall decline as above.    Plan: LTC support for assist with mobility, transfers, meds, meals, activity   Continue sertraline, which has helped with her anxiety    (E03.9) Hypothyroidism, unspecified type  Comment: on replacement  TSH   Date Value Ref Range  Status   03/15/2018 2.57 0.30 - 5.00 uIU/mL Final   Plan: yearly TSH    (M15.0) Primary osteoarthritis involving multiple joints  Comment: intermittent and chronic pain, remains ambulatory  Plan: vit D, calcium, scheduled acetaminophen and hydromorphone for comfort.        Mayte Arellano MD       Sincerely,        Mayte Arellano MD

## 2018-08-14 NOTE — PROGRESS NOTES
"Marce Segura is a 89 year old female seen August 6, 2018 at [facility] where she has resided for 3 years (admit 9/2015) seen to follow up atrial fibrillation, chronic diastolic CHF and dementia.     Patient is seen in her room resting abed.   She has difficulty hearing and understanding questions, but is able to make her needs known.  Nursing staff reports some general decline, cognitively and physically.   Will be in the hallway and ask \"Where's my room?\"   Eating has diminished and weight is down (since admit she is down 20#).  She was previously ambulating to the DR with her walker, now in WC.  Spends more time in bed.      Patient has diastolic CHF with LVH and mod AS on last ECHO.    She had an episode of afib during a 2015 hospitalization.   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       Past Medical History:   Diagnosis Date     Aortic stenosis     7/2014 Mod AS per Echo     Atrial fibrillation (H)      Back pain      Congestive heart failure (H)      Dementia without behavioral disturbance      Hyperkalemia      Hypernatremia      Lumbar compression fracture (H)      Osteoporosis      Subarachnoid hemorrhage (H) 8/10/15    C1 vertebra     SH:  , son Abraham is first contact.   She also has a daughter Rae Ndiaye  Reports she was one of 10 children in her family.       ROS:  Limited, as above.   Wt Readings from Last 5 Encounters:   08/06/18 85 lb 12.8 oz (38.9 kg)   07/16/18 89 lb (40.4 kg)   06/07/18 84 lb 9.6 oz (38.4 kg)   05/31/18 89 lb 9.6 oz (40.6 kg)   05/24/18 87 lb 12.8 oz (39.8 kg)        EXAM:  Frail, NAD  /66  Pulse 69  Temp 97.5  F (36.4  C)  Resp 14  Ht 5' 2\" (1.575 m)  Wt 85 lb 12.8 oz (38.9 kg)  SpO2 92%  BMI 15.69 kg/m2   Neck supple without adeanopathy  +kyphoscoliosis  Lungs decreased BS, no rales or wheeze  Heart irreg irreg, s1s2@80, 2/6 NATHEN  Abd soft, NT, no distention, +BS  Ext without edema.  Mild " deforming changes of OA.    Neuro: limited verbal skills, STML  Psych: affect okay    Last Comprehensive Metabolic Panel:  Sodium   Date Value Ref Range Status   05/31/2018 142 136 - 145 mmol/L Final     Potassium   Date Value Ref Range Status   06/04/2018 3.8 3.5 - 5.0 mmol/L Final     Chloride   Date Value Ref Range Status   05/31/2018 104 98 - 107 mmol/L Final     Carbon Dioxide   Date Value Ref Range Status   05/31/2018 27 22 - 31 mmol/L Final     Anion Gap   Date Value Ref Range Status   05/31/2018 11 5 - 18 mmol/L Final     Glucose   Date Value Ref Range Status   05/31/2018 73 70 - 125 mg/dL Final     Urea Nitrogen   Date Value Ref Range Status   05/31/2018 18 8 - 28 mg/dL Final     Creatinine   Date Value Ref Range Status   05/31/2018 0.60 0.60 - 1.10 mg/dL Final     GFR Estimate   Date Value Ref Range Status   05/31/2018 >60 >60 ml/min/1.73m2 Final     Calcium   Date Value Ref Range Status   05/31/2018 9.5 8.5 - 10.5 mg/dL Final        IMP/PLAN:  (R63.4) Loss of weight   Comment: eating less, cognitive decline  Plan: nutritional supplements, supportive care.        (I48.2) Chronic atrial fibrillation (H)    Comment: controlled VR without rate slowing medications; stable off amiodarone  Plan: daily ASA for stroke prophylaxis.      (I35.0) Nonrheumatic aortic valve stenosis  (I50.32) Chronic diastolic congestive heart failure (H)  Comment:  no apparent CV symptoms today   Plan: continue to monitor volume status; goal is comfort       (G30.1,  F02.80) Late onset Alzheimer's disease without behavioral disturbance  Comment: with anxious features, ongoing overall decline as above.    Plan: LTC support for assist with mobility, transfers, meds, meals, activity   Continue sertraline, which has helped with her anxiety    (E03.9) Hypothyroidism, unspecified type  Comment: on replacement  TSH   Date Value Ref Range Status   03/15/2018 2.57 0.30 - 5.00 uIU/mL Final   Plan: yearly TSH    (M15.0) Primary osteoarthritis  involving multiple joints  Comment: intermittent and chronic pain, remains ambulatory  Plan: vit D, calcium, scheduled acetaminophen and hydromorphone for comfort.        Mayte Arellano MD

## 2018-09-12 NOTE — TELEPHONE ENCOUNTER
Nurse calling today to report blood in incontinence pad which she believes is from patient's vagina, also very strong, foul odor.  Nurse reporting that they have been pushing fluids and Metronidazole insert has been used in the past without relief from any symptoms.  Nursing reporting that the odor is very strong; patient has brown discharge from vaginal area.  Blood is moderate amount for the past 3-4 days.     Orders/Plan:  1. Hgb tomorrow. Dx: vaginal bleeding vs hematuria  2. UA/UC Dx: possible hematuria  3. Metronidazole 500 mg po BID x 7 days. Dx: Bacterial vaginitis    If UA/UC positive, can DC Metronidazole.     Valorie Manning, CNP

## 2018-09-20 NOTE — LETTER
9/20/2018        RE: Marce Segura  [address] [facility] [address]        [place] [facility]    Chief Complaint   Patient presents with     shelter Acute       [facility] Medical Record Number:  [id number]    HPI:    Marce Segura is a 89 year old  (3/12/1923), who is being seen today for an episodic care visit at [facility] .  HPI information obtained from: facility chart records, facility staff, patient report and [facility] chart review.Today's concern is:    Vaginal bleeding  Staff reporting vaginal bleeding this past week, thick and large amounts at times to her brief.  Appears to vaginal in origin.  Denies/no reports of pain to vaginal area, dysuria.  A hgb was ordered by on-call and remains stable.  A UA/UC was also ordered, however, there was too much blood in it to process.  Staff attempted to obtain a few times, but too much blood.  Patient declines cath.  Currently on premarin cream for vaginal atrophy that has been beneficial for history of frequent urination.  Patient is DNR/DNI, comfort care patient    Daughter, Rae Ndiaye, healthcare agent, contacted and VM left, no answer.      Lab Results   Component Value Date    HGB 10.7 09/19/2018            Last 3 BP's: 111/69, 102/63, 109/59 mmHg  Pulse Readings from Last 4 Encounters:   09/24/18 89   09/20/18 89   08/06/18 69   07/16/18 80     Wt Readings from Last 5 Encounters:   09/24/18 84 lb 9.6 oz (38.4 kg)   09/20/18 84 lb 9.6 oz (38.4 kg)   08/06/18 85 lb 12.8 oz (38.9 kg)   07/16/18 89 lb (40.4 kg)   06/07/18 84 lb 9.6 oz (38.4 kg)       ALLERGIES: No known allergies  Past Medical, Surgical, Family and Social History reviewed and updated in [facility].    Current Outpatient Prescriptions   Medication Sig Dispense Refill     acetaminophen (TYLENOL) 500 MG tablet Take 1,000 mg by mouth as needed for mild pain       acetaminophen (TYLENOL) 500 MG tablet Take 1,000 mg by mouth 3 times  daily       aspirin 81 MG chewable tablet Take 81 mg by mouth daily       bisacodyl (DULCOLAX) 10 MG suppository Place 10 mg rectally daily as needed for constipation       Calcium Carbonate-Vitamin D (CALCIUM + D) 600-200 MG-UNIT per tablet Take 1 tablet by mouth 2 times daily.       HYDROmorphone (DILAUDID) 2 MG tablet Take 1 mg by mouth 3 times daily        HYDROmorphone (DILAUDID) 2 MG tablet Take 0.5 mg by mouth 2 times daily as needed for moderate to severe pain        ipratropium - albuterol 0.5 mg/2.5 mg/3 mL (DUONEB) 0.5-2.5 (3) MG/3ML nebulization Take 1 vial by nebulization every 6 hours as needed for shortness of breath / dyspnea or wheezing AND give TID until 8/1/2016 then go back to q 6 hrs PRN       Levothyroxine Sodium (SYNTHROID PO) Take 75 mcg by mouth        Menthol-Methyl Salicylate (ICY HOT EXTRA STRENGTH) 10-30 % CREA Externally apply topically 3 times daily       miconazole (MICATIN) 200 MG vaginal suppository Place 200 mg vaginally At Bedtime       Multiple Vitamin (MULTIVITAMIN) per tablet Take 1 tablet by mouth daily.       pramox-pe-glycerin-petrolatum (PREPARATION H) 1-0.25-14.4-15 % CREA Place rectally 4 times daily as needed for hemorrhoids       senna-docusate (SENOKOT-S;PERICOLACE) 8.6-50 MG per tablet Take 2 tablets by mouth 2 times daily        sertraline (ZOLOFT) 25 MG tablet Take 50 mg by mouth daily        witch hazel-glycerin (TUCKS) pad Apply 1 each topically as needed for hemorrhoids       METRONIDAZOLE PO Take 500 mg by mouth 2 times daily       Medications reviewed:  Medications reconciled to facility chart and changes were made to reflect current medications as identified as above med list. Below are the changes that were made:   Medications stopped since last EPIC medication reconciliation:   There are no discontinued medications.    Medications started since last [facility] medication reconciliation:  No orders of the defined types were placed in this encounter.        REVIEW  "OF SYSTEMS:  4 point ROS including Respiratory, CV, GI and , other than that noted in the HPI,  is negative    Physical Exam:  /69  Pulse 89  Temp 97.8  F (36.6  C)  Resp 20  Ht 5' 2\" (1.575 m)  Wt 84 lb 9.6 oz (38.4 kg)  SpO2 94%  BMI 15.47 kg/m2  GENERAL APPEARANCE:  Alert, in no distress  RESP:  respiratory effort and palpation of chest normal, no respiratory distress  :    small amount of blood noted in vaginal vault, no blood noted to urethra, small amount of brownish/reddish blood noted in brief  PSYCH:  insight and judgement impaired, affect and mood normal    Recent Labs:     9/19/18 HEMOGLOBIN 10.7    CBC RESULTS:   Recent Labs   Lab Test 09/13/18 12/01/17 11/05/15   WBC   --   8.4  7.0   RBC   --   3.55*  3.71*   HGB  10.0*  10.8*  11.0*   HCT   --   33.2*  33.7*   MCV   --   94  91   MCH   --   30.4  29.6   MCHC   --   32.5  32.6   RDW   --   15.7*  15.6*   PLT   --   160  206       Last Basic Metabolic Panel:  Recent Labs   Lab Test 08/27/18 06/04/18 05/31/18   NA  143   --    --   142   POTASSIUM  3.7  3.8   < >  3.3*   CHLORIDE  109*   --    --   104   MARIANO  9.2   --    --   9.5   CO2  28   --    --   27   BUN  18   --    --   18   CR  0.59*   --    --   0.60   GLC  75   --    --   73    < > = values in this interval not displayed.       TSH   Date Value Ref Range Status   03/15/2018 2.57 0.30 - 5.00 uIU/mL Final   08/28/2017 1.85 0.30 - 5.00 uIU/mL Final       Assessment/Plan:  (N93.9) Vaginal bleeding  (primary encounter diagnosis)  Comment: acute, etiology unclear, hemorraghic cystitis, feel less likely given no blood noted at urethra and no other s/s of UTI, malignancy? Fibroids? S/e of premarin cream use  -goal of care comfort, no current s/s of pain    Plan: d'c premarin cream  -unable to reach daughter, but in past goal of care comfort, do not believe would want further w/u  -Hgb weekly for 1 month  -staff to update if change in s/s     Total time spent with patient visit was 25 " min including patient visit, review of past records, phone call to patient contact and discussion with SW as unable to reach daughter. Greater than 50% of total time spent with counseling and coordinating care.     Electronically signed by  JH Hogan CNP                      Sincerely,        JH Hogan CNP

## 2018-09-20 NOTE — PROGRESS NOTES
[place] [facility]    Chief Complaint   Patient presents with     senior care Acute       [place] Medical Record Number:  [id number]    HPI:    Marce Segura is a 89 year old  (3/12/1923), who is being seen today for an episodic care visit at [facility] .  HPI information obtained from: facility chart records, facility staff, patient report and [facility] chart review.Today's concern is:    Vaginal bleeding  Staff reporting vaginal bleeding this past week, thick and large amounts at times to her brief.  Appears to vaginal in origin.  Denies/no reports of pain to vaginal area, dysuria.  A hgb was ordered by on-call and remains stable.  A UA/UC was also ordered, however, there was too much blood in it to process.  Staff attempted to obtain a few times, but too much blood.  Patient declines cath.  Currently on premarin cream for vaginal atrophy that has been beneficial for history of frequent urination.  Patient is DNR/DNI, comfort care patient    Daughter, Rae Ndiaye, healthcare agent, contacted and VM left, no answer.      Lab Results   Component Value Date    HGB 10.7 09/19/2018            Last 3 BP's: 111/69, 102/63, 109/59 mmHg  Pulse Readings from Last 4 Encounters:   09/24/18 89   09/20/18 89   08/06/18 69   07/16/18 80     Wt Readings from Last 5 Encounters:   09/24/18 84 lb 9.6 oz (38.4 kg)   09/20/18 84 lb 9.6 oz (38.4 kg)   08/06/18 85 lb 12.8 oz (38.9 kg)   07/16/18 89 lb (40.4 kg)   06/07/18 84 lb 9.6 oz (38.4 kg)       ALLERGIES: No known allergies  Past Medical, Surgical, Family and Social History reviewed and [facility] in [facility].    Current Outpatient Prescriptions   Medication Sig Dispense Refill     acetaminophen (TYLENOL) 500 MG tablet Take 1,000 mg by mouth as needed for mild pain       acetaminophen (TYLENOL) 500 MG tablet Take 1,000 mg by mouth 3 times daily       aspirin 81 MG chewable tablet Take 81 mg by mouth daily       bisacodyl (DULCOLAX) 10 MG  suppository Place 10 mg rectally daily as needed for constipation       Calcium Carbonate-Vitamin D (CALCIUM + D) 600-200 MG-UNIT per tablet Take 1 tablet by mouth 2 times daily.       HYDROmorphone (DILAUDID) 2 MG tablet Take 1 mg by mouth 3 times daily        HYDROmorphone (DILAUDID) 2 MG tablet Take 0.5 mg by mouth 2 times daily as needed for moderate to severe pain        ipratropium - albuterol 0.5 mg/2.5 mg/3 mL (DUONEB) 0.5-2.5 (3) MG/3ML nebulization Take 1 vial by nebulization every 6 hours as needed for shortness of breath / dyspnea or wheezing AND give TID until 8/1/2016 then go back to q 6 hrs PRN       Levothyroxine Sodium (SYNTHROID PO) Take 75 mcg by mouth        Menthol-Methyl Salicylate (ICY HOT EXTRA STRENGTH) 10-30 % CREA Externally apply topically 3 times daily       miconazole (MICATIN) 200 MG vaginal suppository Place 200 mg vaginally At Bedtime       Multiple Vitamin (MULTIVITAMIN) per tablet Take 1 tablet by mouth daily.       pramox-pe-glycerin-petrolatum (PREPARATION H) 1-0.25-14.4-15 % CREA Place rectally 4 times daily as needed for hemorrhoids       senna-docusate (SENOKOT-S;PERICOLACE) 8.6-50 MG per tablet Take 2 tablets by mouth 2 times daily        sertraline (ZOLOFT) 25 MG tablet Take 50 mg by mouth daily        witch hazel-glycerin (TUCKS) pad Apply 1 each topically as needed for hemorrhoids       METRONIDAZOLE PO Take 500 mg by mouth 2 times daily       Medications reviewed:  Medications reconciled to facility chart and changes were made to reflect current medications as identified as above med list. Below are the changes that were made:   Medications stopped since last EPIC medication reconciliation:   There are no discontinued medications.    Medications started since last [facility] medication reconciliation:  No orders of the defined types were placed in this encounter.        REVIEW OF SYSTEMS:  4 point ROS including Respiratory, CV, GI and , other than that noted in the HPI,  is  "negative    Physical Exam:  /69  Pulse 89  Temp 97.8  F (36.6  C)  Resp 20  Ht 5' 2\" (1.575 m)  Wt 84 lb 9.6 oz (38.4 kg)  SpO2 94%  BMI 15.47 kg/m2  GENERAL APPEARANCE:  Alert, in no distress  RESP:  respiratory effort and palpation of chest normal, no respiratory distress  :    small amount of blood noted in vaginal vault, no blood noted to urethra, small amount of brownish/reddish blood noted in brief  PSYCH:  insight and judgement impaired, affect and mood normal    Recent Labs:     9/19/18 HEMOGLOBIN 10.7    CBC RESULTS:   Recent Labs   Lab Test 09/13/18 12/01/17 11/05/15   WBC   --   8.4  7.0   RBC   --   3.55*  3.71*   HGB  10.0*  10.8*  11.0*   HCT   --   33.2*  33.7*   MCV   --   94  91   MCH   --   30.4  29.6   MCHC   --   32.5  32.6   RDW   --   15.7*  15.6*   PLT   --   160  206       Last Basic Metabolic Panel:  Recent Labs   Lab Test 08/27/18 06/04/18 05/31/18   NA  143   --    --   142   POTASSIUM  3.7  3.8   < >  3.3*   CHLORIDE  109*   --    --   104   MARIANO  9.2   --    --   9.5   CO2  28   --    --   27   BUN  18   --    --   18   CR  0.59*   --    --   0.60   GLC  75   --    --   73    < > = values in this interval not displayed.       TSH   Date Value Ref Range Status   03/15/2018 2.57 0.30 - 5.00 uIU/mL Final   08/28/2017 1.85 0.30 - 5.00 uIU/mL Final       Assessment/Plan:  (N93.9) Vaginal bleeding  (primary encounter diagnosis)  Comment: acute, etiology unclear, hemorraghic cystitis, feel less likely given no blood noted at urethra and no other s/s of UTI, malignancy? Fibroids? S/e of premarin cream use  -goal of care comfort, no current s/s of pain    Plan: d'c premarin cream  -unable to reach daughter, but in past goal of care comfort, do not believe would want further w/u  -Hgb weekly for 1 month  -staff to update if change in s/s     Total time spent with patient visit was 25 min including patient visit, review of past records, phone call to patient contact and discussion " with SW as unable to reach daughter. Greater than 50% of total time spent with counseling and coordinating care.     Electronically signed by  JH Hogan CNP

## 2018-09-24 NOTE — LETTER
9/24/2018        RE: Marce Segura  [address] [address] [facility] [address]        [place] [facility]    Chief Complaint   Patient presents with     assisted Acute       [place] Medical Record Number:  [id number]    HPI:    Marce Segura is a 89 year old  (3/12/1923), who is being seen today for an episodic care visit at [facility] .  HPI information obtained from: facility chart records, facility staff, patient report and family/first contact sonAbraham report.Today's concern is:    Late onset Alzheimer's disease without behavioral disturbance  Loss of weight  Primary reason for LTC is dementia, with recent gradual progressive decline-----increasing lethargy, increase in forgetfulness/confusion, not wanting to participate in activities at NH, unable to find room, gradual and slow weight loss.  Staff reporting that this weekend, patient did not want to get out of bed or take her oral medications, not eating much at all.    Increase in anxiety features, her Zoloft was increased on 5/25/18 due to this, and per NH records, no reports of increased anxiety noted in the evenings since dose increase (typically when patient's anxiety increases)        Appetite is poor, continues to loose weight.  Admitted with weight 104lbs in 2015, currently at 84lbs.  Body mass index is 15.47 kg/(m^2).She is on juice supplement in the NH and dietary follows.  Interventions in place but limited due to progressing dementia and overall decreased appetite    Wt Readings from Last 3 Encounters:   09/24/18 84 lb 9.6 oz (38.4 kg)   09/20/18 84 lb 9.6 oz (38.4 kg)   08/06/18 85 lb 12.8 oz (38.9 kg)          Bacterial vaginitis  Recently treated with course of metronidazole for 7 days.  Had very strong vaginal odor per staff, this decreased with oral meds, but staff reporting odor present again since stop of med.  Patient denies any pain or itching, or abnormal vaginal discharge  to area, staff report she has large and thick odorous vaginal discharge     Advanced directives, counseling/discussion  Daughter, Rae Ndiaye, is her healthcare agent.  Have been attempting to reach and unable to get ahold of.  SW is aware.  I did speak with her son, Abraham today and discussed patient's recent decline.  They are in agreement for hospice eval.  Goal of care comfort, avoid hospital     Vaginal bleeding  Seen last week for, goal of care comfort, not going to work-up.  She denies pain, continues with vaginal bleeding, mild.  Hgb stable.  Her premarin cream was d'cd last week        Last 3 BP's: 111/69, 102/63, 109/59 mmHg  Pulse Readings from Last 4 Encounters:   09/24/18 89   09/20/18 89   08/06/18 69   07/16/18 80     Wt Readings from Last 5 Encounters:   09/24/18 84 lb 9.6 oz (38.4 kg)   09/20/18 84 lb 9.6 oz (38.4 kg)   08/06/18 85 lb 12.8 oz (38.9 kg)   07/16/18 89 lb (40.4 kg)   06/07/18 84 lb 9.6 oz (38.4 kg)       ALLERGIES: No known allergies  Past Medical, Surgical, Family and Social History reviewed and updated in [facility].    Current Outpatient Prescriptions   Medication Sig Dispense Refill     acetaminophen (TYLENOL) 500 MG tablet Take 1,000 mg by mouth as needed for mild pain       acetaminophen (TYLENOL) 500 MG tablet Take 1,000 mg by mouth 3 times daily       aspirin 81 MG chewable tablet Take 81 mg by mouth daily       bisacodyl (DULCOLAX) 10 MG suppository Place 10 mg rectally daily as needed for constipation       Calcium Carbonate-Vitamin D (CALCIUM + D) 600-200 MG-UNIT per tablet Take 1 tablet by mouth 2 times daily.       HYDROmorphone (DILAUDID) 2 MG tablet Take 1 mg by mouth 3 times daily        HYDROmorphone (DILAUDID) 2 MG tablet Take 0.5 mg by mouth 2 times daily as needed for moderate to severe pain        ipratropium - albuterol 0.5 mg/2.5 mg/3 mL (DUONEB) 0.5-2.5 (3) MG/3ML nebulization Take 1 vial by nebulization every 6 hours as needed for shortness of breath / dyspnea or  "wheezing AND give TID until 8/1/2016 then go back to q 6 hrs PRN       Levothyroxine Sodium (SYNTHROID PO) Take 75 mcg by mouth        Menthol-Methyl Salicylate (ICY HOT EXTRA STRENGTH) 10-30 % CREA Externally apply topically 3 times daily       miconazole (MICATIN) 200 MG vaginal suppository Place 200 mg vaginally At Bedtime       Multiple Vitamin (MULTIVITAMIN) per tablet Take 1 tablet by mouth daily.       pramox-pe-glycerin-petrolatum (PREPARATION H) 1-0.25-14.4-15 % CREA Place rectally 4 times daily as needed for hemorrhoids       senna-docusate (SENOKOT-S;PERICOLACE) 8.6-50 MG per tablet Take 2 tablets by mouth 2 times daily        sertraline (ZOLOFT) 25 MG tablet Take 50 mg by mouth daily        witch hazel-glycerin (TUCKS) pad Apply 1 each topically as needed for hemorrhoids       Medications reviewed:  Medications reconciled to facility chart and changes were made to reflect current medications as identified as above med list. Below are the changes that were made:   Medications stopped since last EPIC medication reconciliation:   Medications Discontinued During This Encounter   Medication Reason     conjugated estrogens (PREMARIN) vaginal cream Medication Reconciliation Clean Up       Medications started since last [facility] medication reconciliation:  No orders of the defined types were placed in this encounter.        REVIEW OF SYSTEMS:  4 point ROS including Respiratory, CV, GI and , other than that noted in the HPI,  is negative    Physical Exam:  /69  Pulse 89  Temp 97.8  F (36.6  C)  Resp 20  Ht 5' 2\" (1.575 m)  Wt 84 lb 9.6 oz (38.4 kg)  SpO2 94%  BMI 15.47 kg/m2  GENERAL APPEARANCE:  Alert, in no distress, thin, cooperative  NECK:  No adenopathy,masses or thyromegaly  RESP:  respiratory effort and palpation of chest normal, lungs clear to auscultation , no respiratory distress  CV:  Palpation and auscultation of heart done , regular rate and rhythm, no murmur, rub, or gallop, no edema, " grade 2/6 murmur  ABDOMEN:  normal bowel sounds, soft, nontender, no hepatosplenomegaly or other masses  :    small amount of brownish/reddish discharge in brief   NEURO:   Cranial nerves 2-12 are normal tested and grossly at patient's baseline  PSYCH:  insight and judgement impaired, memory impaired , affect and mood normal, orientated to person and place    Recent Labs:     CBC RESULTS:   Recent Labs   Lab Test 09/19/18 09/13/18 12/01/17 11/05/15   WBC   --    --   8.4  7.0   RBC   --    --   3.55*  3.71*   HGB  10.7*  10.0*  10.8*  11.0*   HCT   --    --   33.2*  33.7*   MCV   --    --   94  91   MCH   --    --   30.4  29.6   MCHC   --    --   32.5  32.6   RDW   --    --   15.7*  15.6*   PLT   --    --   160  206       Last Basic Metabolic Panel:  Recent Labs   Lab Test 08/27/18 06/04/18 05/31/18   NA  143   --    --   142   POTASSIUM  3.7  3.8   < >  3.3*   CHLORIDE  109*   --    --   104   MARIANO  9.2   --    --   9.5   CO2  28   --    --   27   BUN  18   --    --   18   CR  0.59*   --    --   0.60   GLC  75   --    --   73    < > = values in this interval not displayed.       TSH   Date Value Ref Range Status   03/15/2018 2.57 0.30 - 5.00 uIU/mL Final   08/28/2017 1.85 0.30 - 5.00 uIU/mL Final       Assessment/Plan:  (G30.1,  F02.80) Late onset Alzheimer's disease without behavioral disturbance  (primary encounter diagnosis)  Comment: chronic, progressive, continues to loose weight, now not wanting to get OOB eat or take her medications  Plan: hospice eval, daughter unable to be reached, sonAbraham agreeable  -staff to assist with cares    (R63.4) Loss of weight  Comment: continues r/t advancing dementia, unavoidable  Plan: dietary follows and on supplementation    (N76.0,  B96.89) Bacterial vaginitis  Comment: improved initially with metronidazole, odor and discharge present again since d'c  Plan: 7 more days of metronidazole     (Z71.89) Advanced directives, counseling/discussion  Comment: have discussed  multiple times with daughterRae in past, comfort cares, has been agreeable to hospice in past, unable to reach daughter currently, sonAbraham reached  Plan: comfort care, hospice eval    (N93.9) Vaginal bleeding  Comment: noted recently, no further working up, focus on comfort  Plan: no longer on premarin cream  -Hgb weekly to monitor  -staff to update with other s/s     Total time spent with patient visit was > 35 minutes including patient visit, review of past records, phone call to patient contact, sonAbraham and discussion with SW as unable to reach daughterRae, discussion with nursing staff. Greater than 50% of total time spent with counseling and coordinating care.     Electronically signed by  JH Hogan CNP                      Sincerely,        JH Hogan CNP

## 2018-09-24 NOTE — PROGRESS NOTES
[facility]    Chief Complaint   Patient presents with     alf Acute       [facility] Medical Record Number:  [id number]    HPI:    Marce Segura is a 89 year old  (3/12/1923), who is being seen today for an episodic care visit at [facility] .  HPI information obtained from: facility chart records, facility staff, patient report and family/first contact son, Abraham report.Today's concern is:    Late onset Alzheimer's disease without behavioral disturbance  Loss of weight  Primary reason for LTC is dementia, with recent gradual progressive decline-----increasing lethargy, increase in forgetfulness/confusion, not wanting to participate in activities at NH, unable to find room, gradual and slow weight loss.  Staff reporting that this weekend, patient did not want to get out of bed or take her oral medications, not eating much at all.    Increase in anxiety features, her Zoloft was increased on 5/25/18 due to this, and per NH records, no reports of increased anxiety noted in the evenings since dose increase (typically when patient's anxiety increases)        Appetite is poor, continues to loose weight.  Admitted with weight 104lbs in 2015, currently at 84lbs.  Body mass index is 15.47 kg/(m^2).She is on juice supplement in the NH and dietary follows.  Interventions in place but limited due to progressing dementia and overall decreased appetite    Wt Readings from Last 3 Encounters:   09/24/18 84 lb 9.6 oz (38.4 kg)   09/20/18 84 lb 9.6 oz (38.4 kg)   08/06/18 85 lb 12.8 oz (38.9 kg)          Bacterial vaginitis  Recently treated with course of metronidazole for 7 days.  Had very strong vaginal odor per staff, this decreased with oral meds, but staff reporting odor present again since stop of med.  Patient denies any pain or itching, or abnormal vaginal discharge to area, staff report she has large and thick odorous vaginal discharge     Advanced directives,  counseling/discussion  Daughter, Rae Ndiaye, is her healthcare agent.  Have been attempting to reach and unable to get ahold of.  SW is aware.  I did speak with her son, Abraham today and discussed patient's recent decline.  They are in agreement for hospice eval.  Goal of care comfort, avoid hospital     Vaginal bleeding  Seen last week for, goal of care comfort, not going to work-up.  She denies pain, continues with vaginal bleeding, mild.  Hgb stable.  Her premarin cream was d'cd last week        Last 3 BP's: 111/69, 102/63, 109/59 mmHg  Pulse Readings from Last 4 Encounters:   09/24/18 89   09/20/18 89   08/06/18 69   07/16/18 80     Wt Readings from Last 5 Encounters:   09/24/18 84 lb 9.6 oz (38.4 kg)   09/20/18 84 lb 9.6 oz (38.4 kg)   08/06/18 85 lb 12.8 oz (38.9 kg)   07/16/18 89 lb (40.4 kg)   06/07/18 84 lb 9.6 oz (38.4 kg)       ALLERGIES: No known allergies  Past Medical, Surgical, Family and Social History reviewed and updated in [facility].    Current Outpatient Prescriptions   Medication Sig Dispense Refill     acetaminophen (TYLENOL) 500 MG tablet Take 1,000 mg by mouth as needed for mild pain       acetaminophen (TYLENOL) 500 MG tablet Take 1,000 mg by mouth 3 times daily       aspirin 81 MG chewable tablet Take 81 mg by mouth daily       bisacodyl (DULCOLAX) 10 MG suppository Place 10 mg rectally daily as needed for constipation       Calcium Carbonate-Vitamin D (CALCIUM + D) 600-200 MG-UNIT per tablet Take 1 tablet by mouth 2 times daily.       HYDROmorphone (DILAUDID) 2 MG tablet Take 1 mg by mouth 3 times daily        HYDROmorphone (DILAUDID) 2 MG tablet Take 0.5 mg by mouth 2 times daily as needed for moderate to severe pain        ipratropium - albuterol 0.5 mg/2.5 mg/3 mL (DUONEB) 0.5-2.5 (3) MG/3ML nebulization Take 1 vial by nebulization every 6 hours as needed for shortness of breath / dyspnea or wheezing AND give TID until 8/1/2016 then go back to q 6 hrs PRN       Levothyroxine Sodium (SYNTHROID  "PO) Take 75 mcg by mouth        Menthol-Methyl Salicylate (ICY HOT EXTRA STRENGTH) 10-30 % CREA Externally apply topically 3 times daily       miconazole (MICATIN) 200 MG vaginal suppository Place 200 mg vaginally At Bedtime       Multiple Vitamin (MULTIVITAMIN) per tablet Take 1 tablet by mouth daily.       pramox-pe-glycerin-petrolatum (PREPARATION H) 1-0.25-14.4-15 % CREA Place rectally 4 times daily as needed for hemorrhoids       senna-docusate (SENOKOT-S;PERICOLACE) 8.6-50 MG per tablet Take 2 tablets by mouth 2 times daily        sertraline (ZOLOFT) 25 MG tablet Take 50 mg by mouth daily        witch hazel-glycerin (TUCKS) pad Apply 1 each topically as needed for hemorrhoids       Medications reviewed:  Medications reconciled to facility chart and changes were made to reflect current medications as identified as above med list. Below are the changes that were made:   Medications stopped since last EPIC medication reconciliation:   Medications Discontinued During This Encounter   Medication Reason     conjugated estrogens (PREMARIN) vaginal cream Medication Reconciliation Clean Up       Medications started since last [facility] medication reconciliation:  No orders of the defined types were placed in this encounter.        REVIEW OF SYSTEMS:  4 point ROS including Respiratory, CV, GI and , other than that noted in the HPI,  is negative    Physical Exam:  /69  Pulse 89  Temp 97.8  F (36.6  C)  Resp 20  Ht 5' 2\" (1.575 m)  Wt 84 lb 9.6 oz (38.4 kg)  SpO2 94%  BMI 15.47 kg/m2  GENERAL APPEARANCE:  Alert, in no distress, thin, cooperative  NECK:  No adenopathy,masses or thyromegaly  RESP:  respiratory effort and palpation of chest normal, lungs clear to auscultation , no respiratory distress  CV:  Palpation and auscultation of heart done , regular rate and rhythm, no murmur, rub, or gallop, no edema, grade 2/6 murmur  ABDOMEN:  normal bowel sounds, soft, nontender, no hepatosplenomegaly or other " masses  :    small amount of brownish/reddish discharge in brief   NEURO:   Cranial nerves 2-12 are normal tested and grossly at patient's baseline  PSYCH:  insight and judgement impaired, memory impaired , affect and mood normal, orientated to person and place    Recent Labs:     CBC RESULTS:   Recent Labs   Lab Test 09/19/18 09/13/18 12/01/17 11/05/15   WBC   --    --   8.4  7.0   RBC   --    --   3.55*  3.71*   HGB  10.7*  10.0*  10.8*  11.0*   HCT   --    --   33.2*  33.7*   MCV   --    --   94  91   MCH   --    --   30.4  29.6   MCHC   --    --   32.5  32.6   RDW   --    --   15.7*  15.6*   PLT   --    --   160  206       Last Basic Metabolic Panel:  Recent Labs   Lab Test 08/27/18 06/04/18 05/31/18   NA  143   --    --   142   POTASSIUM  3.7  3.8   < >  3.3*   CHLORIDE  109*   --    --   104   MARIANO  9.2   --    --   9.5   CO2  28   --    --   27   BUN  18   --    --   18   CR  0.59*   --    --   0.60   GLC  75   --    --   73    < > = values in this interval not displayed.       TSH   Date Value Ref Range Status   03/15/2018 2.57 0.30 - 5.00 uIU/mL Final   08/28/2017 1.85 0.30 - 5.00 uIU/mL Final       Assessment/Plan:  (G30.1,  F02.80) Late onset Alzheimer's disease without behavioral disturbance  (primary encounter diagnosis)  Comment: chronic, progressive, continues to loose weight, now not wanting to get OOB eat or take her medications  Plan: hospice eval, daughter unable to be reached, son, Abraham agreeable  -staff to assist with cares    (R63.4) Loss of weight  Comment: continues r/t advancing dementia, unavoidable  Plan: dietary follows and on supplementation    (N76.0,  B96.89) Bacterial vaginitis  Comment: improved initially with metronidazole, odor and discharge present again since d'c  Plan: 7 more days of metronidazole     (Z71.89) Advanced directives, counseling/discussion  Comment: have discussed multiple times with daughter, Rae Ndiaye in past, comfort cares, has been agreeable to hospice in  past, unable to reach daughter currently, Abraham maxwell reached  Plan: comfort care, hospice eval    (N93.9) Vaginal bleeding  Comment: noted recently, no further working up, focus on comfort  Plan: no longer on premarin cream  -Hgb weekly to monitor  -staff to update with other s/s     Total time spent with patient visit was > 35 minutes including patient visit, review of past records, phone call to patient contact, Abraham maxwell and discussion with SW as unable to reach daughterRae, discussion with nursing staff. Greater than 50% of total time spent with counseling and coordinating care.     Electronically signed by  JH Hogan CNP

## 2018-10-04 NOTE — LETTER
10/4/2018        RE: Marce Segura  [address] [address] [address]          [facility]    Chief Complaint   Patient presents with     USP Regulatory       [facility] Medical Record Number:  [id number]    HPI:    Marce Segura is a 89 year old  (3/12/1923), who is being seen today for a federally mandated E/M visit at [facility] .  HPI information obtained from: facility chart records, facility staff, patient report and [facility] chart review. Today's concerns are:      Chronic atrial fibrillation (H)  Noted when hospitalized August 2015, very symptomatic, RVR.  Started on low dose amiodarone.  Cardiologist Dr. Holloway, recommended to continue ammiodarone given patient's underlying aortic stenosis, as she may become very symptomatic.  However, patient has not been seen by cardiology since December 2015.  Both pharmacist and collaborating physician, Dr. Arellano, recommended to discontinue medication, as patient has been in irregular rhythm last several visits, unclear if medication providing benefit, and s/e profile high for this medication.      After discussion with daughter, robert Ndiaye (healthcare agent) agreeable to d'c in February 2018.  No changes in symptoms or rate control since d'c/      Due to age and goals of care patient is anticoagulated with baby aspirin. She denies shortness of breath, palpitations, chest pain, dizziness or lightheadedness.            Late onset Alzheimer's disease without behavioral disturbance  Protein-calorie malnutrition, unspecified severity (H)  Hospice care patient  Primary reason for LTC is dementia, with recent gradual progressive decline-----increasing lethargy, increase in forgetfulness/confusion, not wanting to participate in activities at NH, unable to find room, gradual and slow weight loss, recently not wanting to take her medications and eating next to nothing.       Increase in anxiety  features, her Zoloft was increased on 5/25/18 due to this, and per NH records, no reports of increased anxiety noted in the evenings since dose increase (typically when patient's anxiety increases)        Body mass index is 14.74 kg/(m^2). .She is on juice supplement in the NH and dietary follows.  Interventions in place but limited due to progressing dementia and overall decreased appetite    Discussed with healthcare agent, Rae Ndiaye, agreeable for hospice.  Admitted to hospice 10/3/18    Current Weight:   Wt Readings from Last 5 Encounters:   10/04/18 80 lb 9.6 oz (36.6 kg)   09/24/18 84 lb 9.6 oz (38.4 kg)   09/20/18 84 lb 9.6 oz (38.4 kg)   08/06/18 85 lb 12.8 oz (38.9 kg)   07/16/18 89 lb (40.4 kg)       Hypothyroidism due to medication  Per history, on synthroid    Lab Results   Component Value Date    TSH 2.57 03/15/2018         Primary osteoarthritis involving multiple joints  Osteoporosis without current pathological fracture, unspecified osteoporosis type  History of osteoporosis, on calcium and vitamin D supplementation, no longer taking. Much less ambulation in past few months.  She had a fall 10/2, denies pain or injury, noted to have bruising to her forehead.  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        Fall noted on 5/29/18, trying to ambulate per self to bathroom in middle of the night.  Afterwards with increased lower back pain.  An x-ray of lumber spine completed, old compression fx noted, unable to exclude new.        Called and discussed POC with daughter, Rae Ndiaye on 5/31.  Patient previously on scheduled and prn dilaudid for pain management, has been effective for pain management per staff and patient.  She infrequently uses prn dilaudid per day at varying times of the day.  No acute neuro changes noted.  Daughter was agreeable to not send patient to neurosurgery, but keep in NH and manage her pain.          Adjustment disorder with mixed anxiety and  depressed mood  Chronic anxiety, disruptive to patient, family was agreeable to start Zoloft, began December 2015, dose recently increased due to worsening anxiety as noted above.  Staff and family feel has been helpful for patient, no adverse effects currently noted.  Goal of care comfort.  Today patient denies anxiety, sleeping well.  Still becomes anxious at times and can exacerbate her symptoms and increase her shortness of breath, has been stable.  Currently on low dose Zoloft, does last increased May 2018 due to increased anxiety, dose increased appears to be effective     Essential hypertension  Per history with some hypotension that has improved with discontinuation of amiodarone.  Denies/no reports of chest pain, palpitations     Last 3 BPs:   BP Readings from Last 3 Encounters:   10/04/18 113/63   09/24/18 111/69   09/20/18 111/69     HR Ranges: 58-93 bpm        Vaginal bleeding  Recent reports of vaginal bleeding fall 2018, goal of care comfort, not going to work-up.  She denies pain, continues with vaginal bleeding, mild.  Hgb stable.  Her premarin cream was d'cd last week.  No reports from staff of vaginal bleeding for past few weeks          ALLERGIES: No known allergies  PAST MEDICAL HISTORY:  has a past medical history of Aortic stenosis; Atrial fibrillation (H); Back pain; Congestive heart failure (H); Dementia without behavioral disturbance; Hyperkalemia; Hypernatremia; Lumbar compression fracture (H); Osteoporosis; and Subarachnoid hemorrhage (H) (8/10/15).  PAST SURGICAL HISTORY:  has a past surgical history that includes neck injury (years ago); Sigmoidoscopy flexible (12/19/2011); and lower back surgery.  FAMILY HISTORY: family history includes Cancer in her sister and sister; Diabetes in her father and mother; Hypertension in her brother.  SOCIAL HISTORY:  reports that she has never smoked. She has never used smokeless tobacco. She reports that she does not drink alcohol or use illicit  drugs.    MEDICATIONS:  Current Outpatient Prescriptions   Medication Sig Dispense Refill     acetaminophen (TYLENOL) 500 MG tablet Take 1,000 mg by mouth as needed for mild pain       acetaminophen (TYLENOL) 500 MG tablet Take 1,000 mg by mouth 3 times daily       aspirin 81 MG chewable tablet Take 81 mg by mouth daily       bisacodyl (DULCOLAX) 10 MG suppository Place 10 mg rectally daily as needed for constipation       Calcium Carbonate-Vitamin D (CALCIUM + D) 600-200 MG-UNIT per tablet Take 1 tablet by mouth 2 times daily.       HYDROmorphone (DILAUDID) 2 MG tablet Take 1 mg by mouth 3 times daily        HYDROmorphone (DILAUDID) 2 MG tablet Take 0.5 mg by mouth 2 times daily as needed for moderate to severe pain        ipratropium - albuterol 0.5 mg/2.5 mg/3 mL (DUONEB) 0.5-2.5 (3) MG/3ML nebulization Take 1 vial by nebulization every 6 hours as needed for shortness of breath / dyspnea or wheezing AND give TID until 8/1/2016 then go back to q 6 hrs PRN       Levothyroxine Sodium (SYNTHROID PO) Take 75 mcg by mouth daily        Menthol-Methyl Salicylate (ICY HOT EXTRA STRENGTH) 10-30 % CREA Externally apply topically 3 times daily       miconazole (MICATIN) 200 MG vaginal suppository Place 200 mg vaginally At Bedtime       Multiple Vitamin (MULTIVITAMIN) per tablet Take 1 tablet by mouth daily.       pramox-pe-glycerin-petrolatum (PREPARATION H) 1-0.25-14.4-15 % CREA Place rectally 4 times daily as needed for hemorrhoids       senna-docusate (SENOKOT-S;PERICOLACE) 8.6-50 MG per tablet Take 2 tablets by mouth 2 times daily        sertraline (ZOLOFT) 25 MG tablet Take 50 mg by mouth daily        Medications reviewed:  Medications reconciled to facility chart and changes were made to reflect current medications as identified as above med list. Below are the changes that were made:   Medications stopped since last EPIC medication reconciliation:   There are no discontinued medications.    Medications started since  "last EPIC medication reconciliation:  No orders of the defined types were placed in this encounter.        Case Management:  I have reviewed the care plan and MDS and do agree with the plan. Patient's desire to return to the community is not present.  Information reviewed:  Medications, vital signs, orders, and nursing notes.    ROS:  10 point ROS of systems including Constitutional, Eyes, Respiratory, Cardiovascular, Gastroenterology, Genitourinary, Integumentary, Muscularskeletal, Psychiatric were all negative except for pertinent positives noted in my HPI.    Exam:  Vitals: /63  Pulse 58  Temp 97.5  F (36.4  C)  Resp 12  Ht 5' 2\" (1.575 m)  Wt 80 lb 9.6 oz (36.6 kg)  SpO2 94%  BMI 14.74 kg/m2  BMI= Body mass index is 14.74 kg/(m^2).  GENERAL APPEARANCE:  Alert, in no distress  EYES:  EOM, conjunctivae, lids, pupils and irises normal  NECK:  No adenopathy,masses or thyromegaly  RESP:  respiratory effort and palpation of chest normal, lungs clear to auscultation but diminished throughout, no respiratory distress  CV:  Palpation and auscultation of heart done , rate normal, no edema, +2 pedal pulses, rate-normal, grade 2/6 murmur  ABDOMEN:  normal bowel sounds, soft, nontender, no hepatosplenomegaly or other masses  M/S:   Gait and station abnormal uses walker for ambulation, kyphosis, no edema/erythema of joints, CMS intact, bunion left foot--no open areas  Digits and nails normal  SKIN:  Inspection of skin and subcutaneous tissue baseline  NEURO:   Cranial nerves 2-12 are normal tested and grossly at patient's baseline, speech clear, no facial asymmetry   PSYCH:  oriented to person, place, insight and judgement impaired, memory impaired , affect and mood normal, no anxiety observed during encounter     Lab/Diagnostic data:   CBC RESULTS:   Lab Test 09/27/18 09/19/18 12/01/17   WBC   --    --    --   8.4   RBC   --    --    --   3.55*   HGB  10.3*  10.7*   < >  10.8*   HCT   --    --    --   33.2* "   MCV   --    --    --   94   MCH   --    --    --   30.4   MCHC   --    --    --   32.5   RDW   --    --    --   15.7*   PLT   --    --    --   160       Last Basic Metabolic Panel:  Recent Labs   Lab Test 08/27/18 06/04/18 05/31/18   NA  143   --    --   142   POTASSIUM  3.7  3.8   < >  3.3*   CHLORIDE  109*   --    --   104   MARIANO  9.2   --    --   9.5   CO2  28   --    --   27   BUN  18   --    --   18   CR  0.59*   --    --   0.60   GLC  75   --    --   73    < > = values in this interval not displayed.       Liver Function Studies -   Recent Labs   Lab Test 08/28/17 05/25/17   PROTTOTAL  6.2  7.1   ALBUMIN  2.9*  3.2*   BILITOTAL  0.3  0.3   ALKPHOS  43*  49   AST  14  16   ALT  6  8       TSH   Date Value Ref Range Status   03/15/2018 2.57 0.30 - 5.00 uIU/mL Final   08/28/2017 1.85 0.30 - 5.00 uIU/mL Final       ASSESSMENT/PLAN  (G30.1,  F02.80) Late onset Alzheimer's disease without behavioral disturbance  (primary encounter diagnosis)  Comment: Progressive with recent progression and decline, weight loss, admitted to hospice 10/3/18  Plan: appropriate for LTC, staff assist with cares  -hospice cares and support  -d'c MV, calcium carb, not taking and comfort cares     (E46) Protein-calorie malnutrition, unspecified severity (H)  Comment: complicated by advancing dementia, continued decrease in appetite with little to no appetite   Plan: on supplements, followed by dietary  -on hospice now  -encourage po intake as patient tolerates  -expect continued decline given advancing dementia     (I48.2) Chronic atrial fibrillation (H)  Comment: Chronic, no changes since d'c of amiodarone   Plan: anticoagulation with baby asa given age and goals of care  -monitor for s/s of RVR and staff to update     (E03.2) Hypothyroidism due to medication  Comment: per history   Plan: continue synthroid    (M15.0) Primary osteoarthritis involving multiple joints  Comment: ***  Plan: ***    (F43.23) Adjustment disorder with mixed  anxiety and depressed mood  Comment: can exacerbate her symptoms of shortness of breath, dose increase in June due to worsening anxiety, dose increase appears to be effective   Plan: continue Zoloft, monitor mood, staff to update with concerns  -goal of care comfort, family in agreement with medication as has improved QOL    (I10) Essential hypertension  Comment: per history, not on meds  Plan: monitor  -no longer monitoring labs hospice     (M81.0) Osteoporosis without current pathological fracture, unspecified osteoporosis type  M15.0) Primary osteoarthritis involving multiple joints  Comment: chronic with good pain control at present  Plan: d'c calcium and vitamin D, not taking, on hospice  -scheduled APAP, icy hot, dilaudid and bowel regimen, family in agreement and has improved QOL by managing pain and adding to comfort      (Z51.5) Hospice care patient  Comment: admitted 10/3 with alzheimer's dx  Plan: continue care and support    (N93.9) Vaginal bleeding  Comment: previously reported, none recently noted, not working up   Plan: monitor, comfort focus       Electronically signed by:  JH Hogan CNP        Sincerely,        JH Hogan CNP

## 2018-10-04 NOTE — LETTER
10/4/2018        RE: Marce Segura  [address] [facility] [id number]          [facility]    Chief Complaint   Patient presents with     senior living Regulatory       [facility] Medical Record Number:  [id number]    HPI:    Marce Segura is a 89 year old  (3/12/1923), who is being seen today for a federally mandated E/M visit at [facility] .  HPI information obtained from: facility chart records, facility staff, patient report and [facility] chart review. Today's concerns are:      Chronic atrial fibrillation (H)  Noted when hospitalized August 2015, very symptomatic, RVR.  Started on low dose amiodarone.  Cardiologist Dr. Holloway, recommended to continue ammiodarone given patient's underlying aortic stenosis, as she may become very symptomatic.  However, patient has not been seen by cardiology since December 2015.  Both pharmacist and collaborating physician, Dr. Arellano, recommended to discontinue medication, as patient has been in irregular rhythm last several visits, unclear if medication providing benefit, and s/e profile high for this medication.      After discussion with daughter, robert Ndiaye (healthcare agent) agreeable to d'c in February 2018.  No changes in symptoms or rate control since d'c/      Due to age and goals of care patient is anticoagulated with baby aspirin. She denies shortness of breath, palpitations, chest pain, dizziness or lightheadedness.            Late onset Alzheimer's disease without behavioral disturbance  Protein-calorie malnutrition, unspecified severity (H)  Hospice care patient  Primary reason for LTC is dementia, with recent gradual progressive decline-----increasing lethargy, increase in forgetfulness/confusion, not wanting to participate in activities at NH, unable to find room, gradual and slow weight loss, recently not wanting to take her medications and eating next to nothing.       Increase in anxiety  features, her Zoloft was increased on 5/25/18 due to this, and per NH records, no reports of increased anxiety noted in the evenings since dose increase (typically when patient's anxiety increases)        Body mass index is 14.74 kg/(m^2). .She is on juice supplement in the NH and dietary follows.  Interventions in place but limited due to progressing dementia and overall decreased appetite    Discussed with healthcare agent, Rae Ndiaye, agreeable for hospice.  Admitted to hospice 10/3/18    Current Weight:   Wt Readings from Last 5 Encounters:   10/04/18 80 lb 9.6 oz (36.6 kg)   09/24/18 84 lb 9.6 oz (38.4 kg)   09/20/18 84 lb 9.6 oz (38.4 kg)   08/06/18 85 lb 12.8 oz (38.9 kg)   07/16/18 89 lb (40.4 kg)       Hypothyroidism due to medication  Per history, on synthroid    Lab Results   Component Value Date    TSH 2.57 03/15/2018         Primary osteoarthritis involving multiple joints  Osteoporosis without current pathological fracture, unspecified osteoporosis type  History of osteoporosis, on calcium and vitamin D supplementation, no longer taking. Much less ambulation in past few months.  She had a fall 10/2, denies pain or injury, noted to have bruising to her forehead.  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        Fall noted on 5/29/18, trying to ambulate per self to bathroom in middle of the night.  Afterwards with increased lower back pain.  An x-ray of lumber spine completed, old compression fx noted, unable to exclude new.        Called and discussed POC with daughter, Rae Ndiaye on 5/31.  Patient previously on scheduled and prn dilaudid for pain management, has been effective for pain management per staff and patient.  She infrequently uses prn dilaudid per day at varying times of the day.  No acute neuro changes noted.  Daughter was agreeable to not send patient to neurosurgery, but keep in NH and manage her pain.          Adjustment disorder with mixed anxiety and  depressed mood  Chronic anxiety, disruptive to patient, family was agreeable to start Zoloft, began December 2015, dose recently increased due to worsening anxiety as noted above.  Staff and family feel has been helpful for patient, no adverse effects currently noted.  Goal of care comfort.  Today patient denies anxiety, sleeping well.  Still becomes anxious at times and can exacerbate her symptoms and increase her shortness of breath, has been stable.  Currently on low dose Zoloft, does last increased May 2018 due to increased anxiety, dose increased appears to be effective     Essential hypertension  Per history with some hypotension that has improved with discontinuation of amiodarone.  Denies/no reports of chest pain, palpitations     Last 3 BPs:   BP Readings from Last 3 Encounters:   10/04/18 113/63   09/24/18 111/69   09/20/18 111/69     HR Ranges: 58-93 bpm        Vaginal bleeding  Recent reports of vaginal bleeding fall 2018, goal of care comfort, not going to work-up.  She denies pain, continues with vaginal bleeding, mild.  Hgb stable.  Her premarin cream was d'cd last week.  No reports from staff of vaginal bleeding for past few weeks          ALLERGIES: No known allergies  PAST MEDICAL HISTORY:  has a past medical history of Aortic stenosis; Atrial fibrillation (H); Back pain; Congestive heart failure (H); Dementia without behavioral disturbance; Hyperkalemia; Hypernatremia; Lumbar compression fracture (H); Osteoporosis; and Subarachnoid hemorrhage (H) (8/10/15).  PAST SURGICAL HISTORY:  has a past surgical history that includes neck injury (years ago); Sigmoidoscopy flexible (12/19/2011); and lower back surgery.  FAMILY HISTORY: family history includes Cancer in her sister and sister; Diabetes in her father and mother; Hypertension in her brother.  SOCIAL HISTORY:  reports that she has never smoked. She has never used smokeless tobacco. She reports that she does not drink alcohol or use illicit  drugs.    MEDICATIONS:  Current Outpatient Prescriptions   Medication Sig Dispense Refill     acetaminophen (TYLENOL) 500 MG tablet Take 1,000 mg by mouth as needed for mild pain       acetaminophen (TYLENOL) 500 MG tablet Take 1,000 mg by mouth 3 times daily       aspirin 81 MG chewable tablet Take 81 mg by mouth daily       bisacodyl (DULCOLAX) 10 MG suppository Place 10 mg rectally daily as needed for constipation       Calcium Carbonate-Vitamin D (CALCIUM + D) 600-200 MG-UNIT per tablet Take 1 tablet by mouth 2 times daily.       HYDROmorphone (DILAUDID) 2 MG tablet Take 1 mg by mouth 3 times daily        HYDROmorphone (DILAUDID) 2 MG tablet Take 0.5 mg by mouth 2 times daily as needed for moderate to severe pain        ipratropium - albuterol 0.5 mg/2.5 mg/3 mL (DUONEB) 0.5-2.5 (3) MG/3ML nebulization Take 1 vial by nebulization every 6 hours as needed for shortness of breath / dyspnea or wheezing AND give TID until 8/1/2016 then go back to q 6 hrs PRN       Levothyroxine Sodium (SYNTHROID PO) Take 75 mcg by mouth daily        Menthol-Methyl Salicylate (ICY HOT EXTRA STRENGTH) 10-30 % CREA Externally apply topically 3 times daily       miconazole (MICATIN) 200 MG vaginal suppository Place 200 mg vaginally At Bedtime       Multiple Vitamin (MULTIVITAMIN) per tablet Take 1 tablet by mouth daily.       pramox-pe-glycerin-petrolatum (PREPARATION H) 1-0.25-14.4-15 % CREA Place rectally 4 times daily as needed for hemorrhoids       senna-docusate (SENOKOT-S;PERICOLACE) 8.6-50 MG per tablet Take 2 tablets by mouth 2 times daily        sertraline (ZOLOFT) 25 MG tablet Take 50 mg by mouth daily        Medications reviewed:  Medications reconciled to facility chart and changes were made to reflect current medications as identified as above med list. Below are the changes that were made:   Medications stopped since last EPIC medication reconciliation:   There are no discontinued medications.    Medications started since  "last EPIC medication reconciliation:  No orders of the defined types were placed in this encounter.        Case Management:  I have reviewed the care plan and MDS and do agree with the plan. Patient's desire to return to the community is not present.  Information reviewed:  Medications, vital signs, orders, and nursing notes.    ROS:  10 point ROS of systems including Constitutional, Eyes, Respiratory, Cardiovascular, Gastroenterology, Genitourinary, Integumentary, Muscularskeletal, Psychiatric were all negative except for pertinent positives noted in my HPI.    Exam:  Vitals: /63  Pulse 58  Temp 97.5  F (36.4  C)  Resp 12  Ht 5' 2\" (1.575 m)  Wt 80 lb 9.6 oz (36.6 kg)  SpO2 94%  BMI 14.74 kg/m2  BMI= Body mass index is 14.74 kg/(m^2).  GENERAL APPEARANCE:  Alert, in no distress  EYES:  EOM, conjunctivae, lids, pupils and irises normal  NECK:  No adenopathy,masses or thyromegaly  RESP:  respiratory effort and palpation of chest normal, lungs clear to auscultation but diminished throughout, no respiratory distress  CV:  Palpation and auscultation of heart done , rate normal, no edema, +2 pedal pulses, rate-normal, grade 2/6 murmur  ABDOMEN:  normal bowel sounds, soft, nontender, no hepatosplenomegaly or other masses  M/S:   Gait and station abnormal uses walker for ambulation, kyphosis, no edema/erythema of joints, CMS intact, bunion left foot--no open areas  Digits and nails normal  SKIN:  Inspection of skin and subcutaneous tissue baseline  NEURO:   Cranial nerves 2-12 are normal tested and grossly at patient's baseline, speech clear, no facial asymmetry   PSYCH:  oriented to person, place, insight and judgement impaired, memory impaired , affect and mood normal, no anxiety observed during encounter     Lab/Diagnostic data:   CBC RESULTS:   Lab Test 09/27/18 09/19/18 12/01/17   WBC   --    --    --   8.4   RBC   --    --    --   3.55*   HGB  10.3*  10.7*   < >  10.8*   HCT   --    --    --   33.2* "   MCV   --    --    --   94   MCH   --    --    --   30.4   MCHC   --    --    --   32.5   RDW   --    --    --   15.7*   PLT   --    --    --   160       Last Basic Metabolic Panel:  Recent Labs   Lab Test 08/27/18 06/04/18 05/31/18   NA  143   --    --   142   POTASSIUM  3.7  3.8   < >  3.3*   CHLORIDE  109*   --    --   104   MARIANO  9.2   --    --   9.5   CO2  28   --    --   27   BUN  18   --    --   18   CR  0.59*   --    --   0.60   GLC  75   --    --   73    < > = values in this interval not displayed.       Liver Function Studies -   Recent Labs   Lab Test 08/28/17 05/25/17   PROTTOTAL  6.2  7.1   ALBUMIN  2.9*  3.2*   BILITOTAL  0.3  0.3   ALKPHOS  43*  49   AST  14  16   ALT  6  8       TSH   Date Value Ref Range Status   03/15/2018 2.57 0.30 - 5.00 uIU/mL Final   08/28/2017 1.85 0.30 - 5.00 uIU/mL Final       ASSESSMENT/PLAN  (G30.1,  F02.80) Late onset Alzheimer's disease without behavioral disturbance  (primary encounter diagnosis)  Comment: Progressive with recent progression and decline, weight loss, admitted to hospice 10/3/18  Plan: appropriate for LTC, staff assist with cares  -hospice cares and support  -d'c MV, calcium carb, not taking and comfort cares     (E46) Protein-calorie malnutrition, unspecified severity (H)  Comment: complicated by advancing dementia, continued decrease in appetite with little to no appetite   Plan: on supplements, followed by dietary  -on hospice now  -encourage po intake as patient tolerates  -expect continued decline given advancing dementia     (I48.2) Chronic atrial fibrillation (H)  Comment: Chronic, no changes since d'c of amiodarone   Plan: anticoagulation with baby asa given age and goals of care  -monitor for s/s of RVR and staff to update     (E03.2) Hypothyroidism due to medication  Comment: per history   Plan: continue synthroid      (F43.23) Adjustment disorder with mixed anxiety and depressed mood  Comment: can exacerbate her symptoms of shortness of  breath, dose increase in June due to worsening anxiety, dose increase appears to be effective   Plan: continue Zoloft, monitor mood, staff to update with concerns  -goal of care comfort, family in agreement with medication as has improved QOL    (I10) Essential hypertension  Comment: per history, not on meds  Plan: monitor  -no longer monitoring labs hospice     (M81.0) Osteoporosis without current pathological fracture, unspecified osteoporosis type  M15.0) Primary osteoarthritis involving multiple joints  Comment: chronic with good pain control at present  Plan: d'c calcium and vitamin D, not taking, on hospice  -scheduled APAP, icy hot, dilaudid and bowel regimen, family in agreement and has improved QOL by managing pain and adding to comfort      (Z51.5) Hospice care patient  Comment: admitted 10/3 with alzheimer's dx  Plan: continue care and support    (N93.9) Vaginal bleeding  Comment: previously reported, none recently noted, not working up   Plan: monitor, comfort focus       Electronically signed by:  JH Hogan CNP        Sincerely,        JH Hogan CNP

## 2018-10-04 NOTE — PROGRESS NOTES
[facility]    Chief Complaint   Patient presents with     halfway Regulatory       [facility] Record Number:  [id number]    HPI:    Marce Segura is a 89 year old  (3/12/1923), who is being seen today for a federally mandated E/M visit at [facility] .  HPI information obtained from: facility chart records, facility staff, patient report and [facility] chart review. Today's concerns are:      Chronic atrial fibrillation (H)  Noted when hospitalized August 2015, very symptomatic, RVR.  Started on low dose amiodarone.  Cardiologist Dr. Holloway, recommended to continue ammiodarone given patient's underlying aortic stenosis, as she may become very symptomatic.  However, patient has not been seen by cardiology since December 2015.  Both pharmacist and collaborating physician, Dr. Arellano, recommended to discontinue medication, as patient has been in irregular rhythm last several visits, unclear if medication providing benefit, and s/e profile high for this medication.      After discussion with daughter, robert Ndiaye (healthcare agent) agreeable to d'c in February 2018.  No changes in symptoms or rate control since d'c/      Due to age and goals of care patient is anticoagulated with baby aspirin. She denies shortness of breath, palpitations, chest pain, dizziness or lightheadedness.            Late onset Alzheimer's disease without behavioral disturbance  Protein-calorie malnutrition, unspecified severity (H)  Hospice care patient  Primary reason for LTC is dementia, with recent gradual progressive decline-----increasing lethargy, increase in forgetfulness/confusion, not wanting to participate in activities at NH, unable to find room, gradual and slow weight loss, recently not wanting to take her medications and eating next to nothing.       Increase in anxiety features, her Zoloft was increased on 5/25/18 due to this, and per NH records, no reports of  increased anxiety noted in the evenings since dose increase (typically when patient's anxiety increases)        Body mass index is 14.74 kg/(m^2). .She is on juice supplement in the NH and dietary follows.  Interventions in place but limited due to progressing dementia and overall decreased appetite    Discussed with healthcare agent, Rae Ndiaye, agreeable for hospice.  Admitted to hospice 10/3/18    Current Weight:   Wt Readings from Last 5 Encounters:   10/04/18 80 lb 9.6 oz (36.6 kg)   09/24/18 84 lb 9.6 oz (38.4 kg)   09/20/18 84 lb 9.6 oz (38.4 kg)   08/06/18 85 lb 12.8 oz (38.9 kg)   07/16/18 89 lb (40.4 kg)       Hypothyroidism due to medication  Per history, on synthroid    Lab Results   Component Value Date    TSH 2.57 03/15/2018         Primary osteoarthritis involving multiple joints  Osteoporosis without current pathological fracture, unspecified osteoporosis type  History of osteoporosis, on calcium and vitamin D supplementation, no longer taking. Much less ambulation in past few months.  She had a fall 10/2, denies pain or injury, noted to have bruising to her forehead.  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        Fall noted on 5/29/18, trying to ambulate per self to bathroom in middle of the night.  Afterwards with increased lower back pain.  An x-ray of lumber spine completed, old compression fx noted, unable to exclude new.        Called and discussed POC with daughter, Rae Ndiaye on 5/31.  Patient previously on scheduled and prn dilaudid for pain management, has been effective for pain management per staff and patient.  She infrequently uses prn dilaudid per day at varying times of the day.  No acute neuro changes noted.  Daughter was agreeable to not send patient to neurosurgery, but keep in NH and manage her pain.          Adjustment disorder with mixed anxiety and depressed mood  Chronic anxiety, disruptive to patient, family was agreeable to start  Zoloft, began December 2015, dose recently increased due to worsening anxiety as noted above.  Staff and family feel has been helpful for patient, no adverse effects currently noted.  Goal of care comfort.  Today patient denies anxiety, sleeping well.  Still becomes anxious at times and can exacerbate her symptoms and increase her shortness of breath, has been stable.  Currently on low dose Zoloft, does last increased May 2018 due to increased anxiety, dose increased appears to be effective     Essential hypertension  Per history with some hypotension that has improved with discontinuation of amiodarone.  Denies/no reports of chest pain, palpitations     Last 3 BPs:   BP Readings from Last 3 Encounters:   10/04/18 113/63   09/24/18 111/69   09/20/18 111/69     HR Ranges: 58-93 bpm        Vaginal bleeding  Recent reports of vaginal bleeding fall 2018, goal of care comfort, not going to work-up.  She denies pain, continues with vaginal bleeding, mild.  Hgb stable.  Her premarin cream was d'cd last week.  No reports from staff of vaginal bleeding for past few weeks          ALLERGIES: No known allergies  PAST MEDICAL HISTORY:  has a past medical history of Aortic stenosis; Atrial fibrillation (H); Back pain; Congestive heart failure (H); Dementia without behavioral disturbance; Hyperkalemia; Hypernatremia; Lumbar compression fracture (H); Osteoporosis; and Subarachnoid hemorrhage (H) (8/10/15).  PAST SURGICAL HISTORY:  has a past surgical history that includes neck injury (years ago); Sigmoidoscopy flexible (12/19/2011); and lower back surgery.  FAMILY HISTORY: family history includes Cancer in her sister and sister; Diabetes in her father and mother; Hypertension in her brother.  SOCIAL HISTORY:  reports that she has never smoked. She has never used smokeless tobacco. She reports that she does not drink alcohol or use illicit drugs.    MEDICATIONS:  Current Outpatient Prescriptions   Medication Sig Dispense Refill      acetaminophen (TYLENOL) 500 MG tablet Take 1,000 mg by mouth as needed for mild pain       acetaminophen (TYLENOL) 500 MG tablet Take 1,000 mg by mouth 3 times daily       aspirin 81 MG chewable tablet Take 81 mg by mouth daily       bisacodyl (DULCOLAX) 10 MG suppository Place 10 mg rectally daily as needed for constipation       Calcium Carbonate-Vitamin D (CALCIUM + D) 600-200 MG-UNIT per tablet Take 1 tablet by mouth 2 times daily.       HYDROmorphone (DILAUDID) 2 MG tablet Take 1 mg by mouth 3 times daily        HYDROmorphone (DILAUDID) 2 MG tablet Take 0.5 mg by mouth 2 times daily as needed for moderate to severe pain        ipratropium - albuterol 0.5 mg/2.5 mg/3 mL (DUONEB) 0.5-2.5 (3) MG/3ML nebulization Take 1 vial by nebulization every 6 hours as needed for shortness of breath / dyspnea or wheezing AND give TID until 8/1/2016 then go back to q 6 hrs PRN       Levothyroxine Sodium (SYNTHROID PO) Take 75 mcg by mouth daily        Menthol-Methyl Salicylate (ICY HOT EXTRA STRENGTH) 10-30 % CREA Externally apply topically 3 times daily       miconazole (MICATIN) 200 MG vaginal suppository Place 200 mg vaginally At Bedtime       Multiple Vitamin (MULTIVITAMIN) per tablet Take 1 tablet by mouth daily.       pramox-pe-glycerin-petrolatum (PREPARATION H) 1-0.25-14.4-15 % CREA Place rectally 4 times daily as needed for hemorrhoids       senna-docusate (SENOKOT-S;PERICOLACE) 8.6-50 MG per tablet Take 2 tablets by mouth 2 times daily        sertraline (ZOLOFT) 25 MG tablet Take 50 mg by mouth daily        Medications reviewed:  Medications reconciled to facility chart and changes were made to reflect current medications as identified as above med list. Below are the changes that were made:   Medications stopped since last EPIC medication reconciliation:   There are no discontinued medications.    Medications started since last [facility] medication reconciliation:  No orders of the defined types were placed in this  "encounter.        Case Management:  I have reviewed the care plan and MDS and do agree with the plan. Patient's desire to return to the community is not present.  Information reviewed:  Medications, vital signs, orders, and nursing notes.    ROS:  10 point ROS of systems including Constitutional, Eyes, Respiratory, Cardiovascular, Gastroenterology, Genitourinary, Integumentary, Muscularskeletal, Psychiatric were all negative except for pertinent positives noted in my HPI.    Exam:  Vitals: /63  Pulse 58  Temp 97.5  F (36.4  C)  Resp 12  Ht 5' 2\" (1.575 m)  Wt 80 lb 9.6 oz (36.6 kg)  SpO2 94%  BMI 14.74 kg/m2  BMI= Body mass index is 14.74 kg/(m^2).  GENERAL APPEARANCE:  Alert, in no distress  EYES:  EOM, conjunctivae, lids, pupils and irises normal  NECK:  No adenopathy,masses or thyromegaly  RESP:  respiratory effort and palpation of chest normal, lungs clear to auscultation but diminished throughout, no respiratory distress  CV:  Palpation and auscultation of heart done , rate normal, no edema, +2 pedal pulses, rate-normal, grade 2/6 murmur  ABDOMEN:  normal bowel sounds, soft, nontender, no hepatosplenomegaly or other masses  M/S:   Gait and station abnormal uses walker for ambulation, kyphosis, no edema/erythema of joints, CMS intact, bunion left foot--no open areas  Digits and nails normal  SKIN:  Inspection of skin and subcutaneous tissue baseline  NEURO:   Cranial nerves 2-12 are normal tested and grossly at patient's baseline, speech clear, no facial asymmetry   PSYCH:  oriented to person, place, insight and judgement impaired, memory impaired , affect and mood normal, no anxiety observed during encounter     Lab/Diagnostic data:   CBC RESULTS:   Lab Test 09/27/18 09/19/18 12/01/17   WBC   --    --    --   8.4   RBC   --    --    --   3.55*   HGB  10.3*  10.7*   < >  10.8*   HCT   --    --    --   33.2*   MCV   --    --    --   94   MCH   --    --    --   30.4   MCHC   --    --    --   32.5 "   RDW   --    --    --   15.7*   PLT   --    --    --   160       Last Basic Metabolic Panel:  Recent Labs   Lab Test 08/27/18 06/04/18 05/31/18   NA  143   --    --   142   POTASSIUM  3.7  3.8   < >  3.3*   CHLORIDE  109*   --    --   104   MARIANO  9.2   --    --   9.5   CO2  28   --    --   27   BUN  18   --    --   18   CR  0.59*   --    --   0.60   GLC  75   --    --   73    < > = values in this interval not displayed.       Liver Function Studies -   Recent Labs   Lab Test 08/28/17 05/25/17   PROTTOTAL  6.2  7.1   ALBUMIN  2.9*  3.2*   BILITOTAL  0.3  0.3   ALKPHOS  43*  49   AST  14  16   ALT  6  8       TSH   Date Value Ref Range Status   03/15/2018 2.57 0.30 - 5.00 uIU/mL Final   08/28/2017 1.85 0.30 - 5.00 uIU/mL Final       ASSESSMENT/PLAN  (G30.1,  F02.80) Late onset Alzheimer's disease without behavioral disturbance  (primary encounter diagnosis)  Comment: Progressive with recent progression and decline, weight loss, admitted to hospice 10/3/18  Plan: appropriate for LTC, staff assist with cares  -hospice cares and support  -d'c MV, calcium carb, not taking and comfort cares     (E46) Protein-calorie malnutrition, unspecified severity (H)  Comment: complicated by advancing dementia, continued decrease in appetite with little to no appetite   Plan: on supplements, followed by dietary  -on hospice now  -encourage po intake as patient tolerates  -expect continued decline given advancing dementia     (I48.2) Chronic atrial fibrillation (H)  Comment: Chronic, no changes since d'c of amiodarone   Plan: anticoagulation with baby asa given age and goals of care  -monitor for s/s of RVR and staff to update     (E03.2) Hypothyroidism due to medication  Comment: per history   Plan: continue synthroid      (F43.23) Adjustment disorder with mixed anxiety and depressed mood  Comment: can exacerbate her symptoms of shortness of breath, dose increase in June due to worsening anxiety, dose increase appears to be effective    Plan: continue Zoloft, monitor mood, staff to update with concerns  -goal of care comfort, family in agreement with medication as has improved QOL    (I10) Essential hypertension  Comment: per history, not on meds  Plan: monitor  -no longer monitoring labs hospice     (M81.0) Osteoporosis without current pathological fracture, unspecified osteoporosis type  M15.0) Primary osteoarthritis involving multiple joints  Comment: chronic with good pain control at present  Plan: d'c calcium and vitamin D, not taking, on hospice  -scheduled APAP, icy hot, dilaudid and bowel regimen, family in agreement and has improved QOL by managing pain and adding to comfort      (Z51.5) Hospice care patient  Comment: admitted 10/3 with alzheimer's dx  Plan: continue care and support    (N93.9) Vaginal bleeding  Comment: previously reported, none recently noted, not working up   Plan: monitor, comfort focus       Electronically signed by:  JH Hogan CNP